# Patient Record
Sex: MALE | Race: WHITE | NOT HISPANIC OR LATINO | Employment: FULL TIME | URBAN - METROPOLITAN AREA
[De-identification: names, ages, dates, MRNs, and addresses within clinical notes are randomized per-mention and may not be internally consistent; named-entity substitution may affect disease eponyms.]

---

## 2017-05-04 ENCOUNTER — OFFICE VISIT (OUTPATIENT)
Dept: FAMILY MEDICINE | Facility: CLINIC | Age: 32
End: 2017-05-04

## 2017-05-04 VITALS
HEART RATE: 109 BPM | WEIGHT: 218.2 LBS | HEIGHT: 68 IN | OXYGEN SATURATION: 95 % | TEMPERATURE: 98.1 F | BODY MASS INDEX: 33.07 KG/M2 | DIASTOLIC BLOOD PRESSURE: 73 MMHG | SYSTOLIC BLOOD PRESSURE: 114 MMHG

## 2017-05-04 DIAGNOSIS — R55 SYNCOPE, UNSPECIFIED SYNCOPE TYPE: ICD-10-CM

## 2017-05-04 DIAGNOSIS — R11.0 NAUSEA: ICD-10-CM

## 2017-05-04 DIAGNOSIS — S09.90XA HEAD INJURY, INITIAL ENCOUNTER: Primary | ICD-10-CM

## 2017-05-04 DIAGNOSIS — R51.9 ACUTE NONINTRACTABLE HEADACHE, UNSPECIFIED HEADACHE TYPE: ICD-10-CM

## 2017-05-04 LAB
BUN SERPL-MCNC: 12.2 MG/DL (ref 7–21)
CALCIUM SERPL-MCNC: 9.7 MG/DL (ref 8.5–10.1)
CHLORIDE SERPLBLD-SCNC: 99.3 MMOL/L (ref 98–110)
CO2 SERPL-SCNC: 27.3 MMOL/L (ref 20–32)
CREAT SERPL-MCNC: 1.1 MG/DL (ref 0.7–1.3)
GFR SERPL CREATININE-BSD FRML MDRD: 83.5 ML/MIN/1.7 M2
GLUCOSE SERPL-MCNC: 105 MG'DL (ref 70–99)
HCT VFR BLD AUTO: 49.5 % (ref 40–53)
HEMOGLOBIN: 16.6 G/DL (ref 13.3–17.7)
MCH RBC QN AUTO: 32.4 PG (ref 26.5–35)
MCHC RBC AUTO-ENTMCNC: 33.5 G/DL (ref 32–36)
MCV RBC AUTO: 96.6 FL (ref 78–100)
PLATELET # BLD AUTO: 383 K/UL (ref 150–450)
POTASSIUM SERPL-SCNC: 4.2 MMOL/DL (ref 3.2–4.6)
RBC # BLD AUTO: 5.1 M/UL (ref 4.4–5.9)
SODIUM SERPL-SCNC: 139.2 MMOL/L (ref 132–142)
WBC # BLD AUTO: 8.4 K/UL (ref 4–11)

## 2017-05-04 RX ORDER — ACETAMINOPHEN 325 MG/1
650 TABLET ORAL EVERY 4 HOURS PRN
Qty: 100 TABLET | Refills: 0 | Status: SHIPPED | OUTPATIENT
Start: 2017-05-04 | End: 2021-03-24

## 2017-05-04 RX ORDER — ONDANSETRON 4 MG/1
4 TABLET, FILM COATED ORAL EVERY 8 HOURS PRN
Qty: 18 TABLET | Refills: 0 | Status: SHIPPED | OUTPATIENT
Start: 2017-05-04 | End: 2021-03-24

## 2017-05-04 NOTE — LETTER
Phu Loja  [address] [place] [address]        May 4, 2017           To Whom It May Concern:    Phu Loja was seen in our clinic. Please excuse the patient from work for the next week from 5/4/17 to 5/12/17 for medical reasons.        Sincerely,                Stephanie Chavez, DO

## 2017-05-04 NOTE — LETTER
May 15, 2017      Phu Loja  [address] [place] [address]        Dear Phu,    Please see below for your test results.  Mr. Loja, your lab results are back and are normal. Your blood cells, kidney function, electrolytes and blood sugar were all normal. Please call with any questions.         Resulted Orders   Basic Metabolic Panel ([place])   Result Value Ref Range    Urea Nitrogen 12.2 7.0 - 21.0 mg/dL    Calcium 9.7 8.5 - 10.1 mg/dL    Chloride 99.3 98.0 - 110.0 mmol/L    Carbon Dioxide 27.3 20.0 - 32.0 mmol/L    Creatinine 1.1 0.7 - 1.3 mg/dL    Glucose 105.0 (H) 70.0 - 99.0 mg'dL    Potassium 4.2 3.2 - 4.6 mmol/dL    Sodium 139.2 132.0 - 142.0 mmol/L    GFR Estimate 83.5 >60.0 mL/min/1.7 m2    GFR Estimate If Black >90 >60.0 mL/min/1.7 m2   CBC with Plt ([facility])   Result Value Ref Range    WBC 8.4 4.0 - 11.0 K/uL    RBC 5.1 4.4 - 5.9 M/uL    Hemoglobin 16.6 13.3 - 17.7 g/dL    Hematocrit 49.5 40.0 - 53.0 %    MCV 96.6 78.0 - 100.0 fL    MCH 32.4 26.5 - 35.0    MCHC 33.5 32.0 - 36.0 g/dL    Platelets 383.0 150.0 - 450.0 K/uL       If you have any questions, please call the clinic to make an appointment.    Sincerely,    Stephanie Chavez MD

## 2017-05-04 NOTE — PROGRESS NOTES
"Subjective  Phu Loja is a 31 year old male with a history of concussion in 2008 but otherwise healthy who presents with complaints of 2 syncopal episodes and subsequent head injury. The patient reports 3 weeks ago he woke in the night and got up to use the bathroom and fell, hitting his head on a countertop. He states his uncle was awake and witnessed his fall and reports loss of consciousness for several minutes hitting his head. After that, the patient has had a persistent headache that is located all over his head with associated nausea but no vomiting. He also reports feeling dizzy and light headed which is worse with rapid movements of his head. He also endorses tinnitus and scotomas. He states he has been working hard and caring for his children since the fall and has been trying to \"work through the pain.\"    Then, yesterday when the patient was at work he was standing at a machine when he had another syncopal episode and fell backwards. He reports LOC for ~10-15 min per coworkers and awoke to EMS. Witnesses report no jerky or seizure like movements. He refused to go to the hospital despite being advised by EMS. Since then he has had a more severe headache and nausea but no vomiting. He reports not being able to recall the minutes prior to the syncopal episode. Endorses mild confusion but no changes in his memory.     ROS: 10 point ROS neg other than the symptoms noted above in the HPI.    Social: , 3 children (1,5, 10 yo)  History   Smoking Status     Former Smoker   Smokeless Tobacco     Not on file       Objective  Vitals: /73 (BP Location: Left arm, Patient Position: Chair, Cuff Size: Adult Large)  Pulse 109  Temp 98.1  F (36.7  C) (Oral)  Ht 5' 8.25\" (173.4 cm)  Wt 218 lb 3.2 oz (99 kg)  SpO2 95%  BMI 32.93 kg/m2  General: Pleasant. Young man. No distress.  HEENT: Normocephalic and non-traumatic head. Moist mucous membranes. Extraocular movements intact. Sclera non-injected. " Pupils equal, round, and reactive to light. Normal fundoscopic exam. Tympanic membranes clear bilaterally. Hearing grossly intact. Oropharynx without swelling, erythema, or exudate. No cervical lymphadenopathy. Neck supple with full range of motion. No tenderness to palpation over head or neck.   Heart: Regular rate and rhythm. No murmurs, rubs, or gallops.  Extremities: Extremities warm and well-perfused. No peripheral edema.  Lungs: Clear to auscultation bilaterally. No wheezes or crackles. Good air movement.  Neck: Normal to inspection.  Full ROM in flexion, extension, lateral flexion, and rotation.  CN XII intact.  No cervical spinous processes tenderness.  Biceps, triceps, and brachioradialis reflexes symmetric and intact.  Neuro: Alert and oriented x3. CN II-XII intact. Deep tendon reflexes of upper and lower extremities symmetric and intact. Strength and sensation grossly symmetric and normal.    Results for orders placed or performed in visit on 05/04/17   Basic Metabolic Panel ([place])   Result Value Ref Range    Urea Nitrogen 12.2 7.0 - 21.0 mg/dL    Calcium 9.7 8.5 - 10.1 mg/dL    Chloride 99.3 98.0 - 110.0 mmol/L    Carbon Dioxide 27.3 20.0 - 32.0 mmol/L    Creatinine 1.1 0.7 - 1.3 mg/dL    Glucose 105.0 (H) 70.0 - 99.0 mg'dL    Potassium 4.2 3.2 - 4.6 mmol/dL    Sodium 139.2 132.0 - 142.0 mmol/L    GFR Estimate 83.5 >60.0 mL/min/1.7 m2    GFR Estimate If Black >90 >60.0 mL/min/1.7 m2   CBC with Plt ([facility])   Result Value Ref Range    WBC 8.4 4.0 - 11.0 K/uL    RBC 5.1 4.4 - 5.9 M/uL    Hemoglobin 16.6 13.3 - 17.7 g/dL    Hematocrit 49.5 40.0 - 53.0 %    MCV 96.6 78.0 - 100.0 fL    MCH 32.4 26.5 - 35.0    MCHC 33.5 32.0 - 36.0 g/dL    Platelets 383.0 150.0 - 450.0 K/uL       Assessment & Plan    Phu was seen today for 2 syncopal episodes followed by head injury.    Diagnoses and all orders for this visit:    Head injury, initial encounter: No acute neurological deficits on exam. Will get imaging  and send to Neuro. Discussed importance of mental/physical rest for next.   -     CT HEAD W/O CONTRAST; Future  -     NEUROLOGY ADULT REFERRAL; Future  -     Basic Metabolic Panel ([place])  -     CBC with Plt ([facility])    Acute nonintractable headache, unspecified headache type  -     Basic Metabolic Panel ([place])  -     CBC with Plt (P )  -     acetaminophen (TYLENOL) 325 MG tablet; Take 2 tablets (650 mg) by mouth every 4 hours as needed for mild pain     Syncope, unspecified syncope type: EKG normal. First syncopal episode may have been secondary to orthostasis, dehydration. Patient may have had concussion. No rest after initial concussion may have resulted in second syncopal episode.   -     EKG 12-lead complete w/read - Clinics    Nausea  -     ondansetron (ZOFRAN) 4 MG tablet; Take 1 tablet (4 mg) by mouth every 8 hours as needed for nausea      Orders Placed This Encounter   Procedures     CT HEAD W/O CONTRAST     Basic Metabolic Panel ([place])     CBC with Plt ([facility])     NEUROLOGY ADULT REFERRAL     EKG 12-lead complete w/read - Clinics       Patient Instructions   CT REFERRAL:  [facility]  [address] [place] [address]  [phone]  Date:  Friday May 5, 2017  Time:  1:15 PM    NEUROLOGY REFERRAL:  [facility]  [address]  [place] [address]  [phone]  Date:  Thursday Vero 15, 2017  Time:  11:15 PM  Please bring insurance card and photo ID to appointment.  Given to patient.  Norma Ahn  5/4/17            Return to clinic in 5-7 days.    Patient precepted with Dr. Hartley.    Stephanie Chavez DO   PGY-1 [facility]  [facility]  Pager: [phone]

## 2017-05-04 NOTE — MR AVS SNAPSHOT
After Visit Summary   5/4/2017    Phu Loja    MRN: [id number]           Patient Information     Date Of Birth          1985        Visit Information        Provider Department      5/4/2017 2:30 PM Stephanie Chavez MD [facility]        Today's Diagnoses     Head injury, initial encounter    -  1    Acute nonintractable headache, unspecified headache type        Syncope, unspecified syncope type        Nausea          Care Instructions    CT REFERRAL:  [facility]  [address] [place] [address]  [phone]  Date:  Friday May 5, 2017  Time:  1:15 PM    NEUROLOGY REFERRAL:  [facility]  [address]  [place] [address]  [phone]  Date:  Thursday Vero 15, 2017  Time:  11:15 PM  Please bring insurance card and photo ID to appointment.  Given to patient.  Norma DARLEEN Ahn  5/4/17              Follow-ups after your visit        Additional Services     NEUROLOGY ADULT REFERRAL       Patient to stop at the [employer] Desk    Reason for Referral: Head Injury x2 with sequelae (headache, nausea)  Referral Location: [facility] [facility] [place]): [phone]     needed: No  Language: English    May leave message on voicemail: Yes    ([place] Only) Referral should be tracked (Yes/No)?                  Who to contact     Please call your clinic at [phone] to:    Ask questions about your health    Make or cancel appointments    Discuss your medicines    Learn about your test results    Speak to your doctor   If you have compliments or concerns about an experience at your clinic, or if you wish to file a complaint, please contact [facility] Patient Relations at [phone] or email us at [email or web address]         Additional Information About Your Visit        MyChart Information     [facility] is an electronic gateway that provides easy, online access to your medical records. With  "MyChart, you can request a clinic appointment, read your test results, renew a prescription or communicate with your care team.     To sign up for [email or web address] visit the website at [email or web address]   You will be asked to enter the access code listed below, as well as some personal information. Please follow the directions to create your username and password.     Your access code is: [email or web address]  Expires: 2017  8:19 AM     Your access code will  in 90 days. If you need help or a new code, please contact your [facility] or call [phone] for assistance.        Care EveryWhere ID     This is your Care EveryWhere ID. This could be used by other organizations to access your [place] medical records  [id number]        Your Vitals Were     Pulse Temperature Height Pulse Oximetry BMI (Body Mass Index)       109 98.1  F (36.7  C) (Oral) 5' 8.25\" (173.4 cm) 95% 32.93 kg/m2        Blood Pressure from Last 3 Encounters:   17 114/73    Weight from Last 3 Encounters:   17 218 lb 3.2 oz (99 kg)              We Performed the Following     Basic Metabolic Panel ([place])     CBC with Plt ([facility])     EKG 12-lead complete w/read - Clinics          Today's Medication Changes          These changes are accurate as of: 17 11:59 PM.  If you have any questions, ask your nurse or doctor.               Start taking these medicines.        Dose/Directions    acetaminophen 325 MG tablet   Commonly known as:  TYLENOL   Used for:  Acute nonintractable headache, unspecified headache type   Started by:  Stephanie Chavez MD        Dose:  650 mg   Take 2 tablets (650 mg) by mouth every 4 hours as needed for mild pain   Quantity:  100 tablet   Refills:  0       ondansetron 4 MG tablet   Commonly known as:  ZOFRAN   Used for:  Nausea   Started by:  Stephanie Chavez MD        Dose:  4 mg   Take 1 tablet (4 mg) by mouth every 8 hours as needed for nausea   Quantity:  " 18 tablet   Refills:  0            Where to get your medicines      These medications were sent to [id number] - [place], [place] - [address]  [address], [place] [address]     Phone:  [phone]     acetaminophen 325 MG tablet    ondansetron 4 MG tablet                Primary Care Provider Office Phone # Fax #    Stephanie Gilbert Chavez [phone]       [facility] [address] [place] [address]        Thank you!     Thank you for choosing [facility]  for your care. Our goal is always to provide you with excellent care. Hearing back from our patients is one way we can continue to improve our services. Please take a few minutes to complete the written survey that you may receive in the mail after your visit with us. Thank you!             Your Updated Medication List - Protect others around you: Learn how to safely use, store and throw away your medicines at www.disposemymeds.org.          This list is accurate as of: 5/4/17 11:59 PM.  Always use your most recent med list.                   Brand Name Dispense Instructions for use    acetaminophen 325 MG tablet    TYLENOL    100 tablet    Take 2 tablets (650 mg) by mouth every 4 hours as needed for mild pain       ondansetron 4 MG tablet    ZOFRAN    18 tablet    Take 1 tablet (4 mg) by mouth every 8 hours as needed for nausea

## 2017-05-04 NOTE — PATIENT INSTRUCTIONS
CT REFERRAL:  [facility]  [address] [place] [address]  [phone]  Date:  Friday May 5, 2017  Time:  1:15 PM    NEUROLOGY REFERRAL:  [facility]  [address]  [place] [address]  [phone]  Date:  Thursday Vero 15, 2017  Time:  11:15 PM  Please bring insurance card and photo ID to appointment.  Given to patient.  Norma Ahn  5/4/17

## 2017-05-09 DIAGNOSIS — S09.90XA HEAD INJURY, INITIAL ENCOUNTER: ICD-10-CM

## 2017-05-11 ENCOUNTER — TELEPHONE (OUTPATIENT)
Dept: FAMILY MEDICINE | Facility: CLINIC | Age: 32
End: 2017-05-11

## 2017-05-11 NOTE — TELEPHONE ENCOUNTER
[facility] phone call message- patient requesting results:    Test: Lab    Date of test: [id number]    Additional Comments: Calling for lab results and patient needs a doctors statement to return to work.    OK to leave a message on voice mail? Yes    Primary language: English      needed? No    Call taken on May 11, 2017 at 9:20 AM by Isai Millard

## 2017-05-11 NOTE — PROGRESS NOTES
Preceptor attestation:  Patient seen and discussed with the resident. Assessment and plan reviewed with resident and agreed upon.  EKG reviewed. No acute findings.  Agree with computer interpretation.  See scanned sheet for details.      FamHx:  Paternal Uncle alive.    Supervising physician: Easton Hartley  [facility]

## 2017-05-11 NOTE — TELEPHONE ENCOUNTER
Pt would like letter/note from MD to return to work (not sure if he should be on restrictions). Pt would like note today if possible. Pt works night shift.     Gave results of CT for pt, normal.     Pt would like results of x-ray taken near [facility]-advised pt it's not scanned in his chart yet.     Pt states the med is helping him w/ his HAs.     Routed to Dr. Chavez. /NGA Nieves

## 2017-05-11 NOTE — TELEPHONE ENCOUNTER
He needs letter (and say no restrictions)-could you put it in and I'll print it out. Thanks!  /NGA Nieves

## 2017-05-11 NOTE — TELEPHONE ENCOUNTER
Please call patient back to let him know it is okay for him to return to work. No bleed seen on head CT. If his headaches are resolved, he is okay to work.    Thank you!    Stephanie Chavez

## 2017-05-12 ENCOUNTER — TELEPHONE (OUTPATIENT)
Dept: FAMILY MEDICINE | Facility: CLINIC | Age: 32
End: 2017-05-12

## 2017-05-12 NOTE — TELEPHONE ENCOUNTER
[facility] phone call message- general phone call:    Reason for call: PT stopped by to  a note to return to work. He will come back this afternoon would like Dr Chavez write him a note for him to go back to work please call pt if any questions   Return call needed: Yes    OK to leave a message on voice mail? Yes    Primary language: English      needed? No    Call taken on May 12, 2017 at 10:32 AM by Mica Lorenzo

## 2017-08-14 ENCOUNTER — OFFICE VISIT (OUTPATIENT)
Dept: FAMILY MEDICINE | Facility: CLINIC | Age: 32
End: 2017-08-14

## 2017-08-14 VITALS
HEIGHT: 69 IN | OXYGEN SATURATION: 95 % | SYSTOLIC BLOOD PRESSURE: 128 MMHG | TEMPERATURE: 99.1 F | HEART RATE: 94 BPM | WEIGHT: 220 LBS | BODY MASS INDEX: 32.58 KG/M2 | DIASTOLIC BLOOD PRESSURE: 82 MMHG

## 2017-08-14 DIAGNOSIS — H10.021 PINK EYE DISEASE OF RIGHT EYE: Primary | ICD-10-CM

## 2017-08-14 RX ORDER — POLYMYXIN B SULFATE AND TRIMETHOPRIM 1; 10000 MG/ML; [USP'U]/ML
1 SOLUTION OPHTHALMIC
Qty: 1 BOTTLE | Refills: 0 | Status: SHIPPED | OUTPATIENT
Start: 2017-08-14 | End: 2017-08-21

## 2017-08-14 NOTE — MR AVS SNAPSHOT
"              After Visit Summary   2017    Phu Loja    MRN: [id number]           Patient Information     Date Of Birth          1985        Visit Information        Provider Department      2017 4:10 PM Jeremiah Jacobsen MD [facility]        Today's Diagnoses     Pink eye disease of right eye    -  1       Follow-ups after your visit        Who to contact     Please call your clinic at [phone] to:    Ask questions about your health    Make or cancel appointments    Discuss your medicines    Learn about your test results    Speak to your doctor   If you have compliments or concerns about an experience at your clinic, or if you wish to file a complaint, please contact [facility] Patient Relations at [phone] or email us at [email or web address]         Additional Information About Your Visit        MyChart Information     [employer] is an electronic gateway that provides easy, online access to your medical records. With [employer], you can request a clinic appointment, read your test results, renew a prescription or communicate with your care team.     To sign up for [employer] visit the website at [email or web address]   You will be asked to enter the access code listed below, as well as some personal information. Please follow the directions to create your username and password.     Your access code is: [email or web address]  Expires: 2017  5:01 PM     Your access code will  in 90 days. If you need help or a new code, please contact your [facility] or call [phone] for assistance.        Care EveryWhere ID     This is your Care EveryWhere ID. This could be used by other organizations to access your [place] medical records  [id number]        Your Vitals Were     Pulse Temperature Height Pulse Oximetry BMI (Body Mass Index)       94 99.1  F (37.3  C) (Oral) 5' 9\" (175.3 cm) 95% 32.49 kg/m2        Blood Pressure " from Last 3 Encounters:   08/14/17 128/82   05/04/17 114/73    Weight from Last 3 Encounters:   08/14/17 220 lb (99.8 kg)   05/04/17 218 lb 3.2 oz (99 kg)              Today, you had the following     No orders found for display         Today's Medication Changes          These changes are accurate as of: 8/14/17  5:01 PM.  If you have any questions, ask your nurse or doctor.               Start taking these medicines.        Dose/Directions    trimethoprim-polymyxin b ophthalmic solution   Commonly known as:  POLYTRIM   Used for:  Pink eye disease of right eye   Started by:  Jeremiah Jacobsen MD        Dose:  1 drop   Apply 1 drop to eye every 3 hours for 7 days   Quantity:  1 Bottle   Refills:  0            Where to get your medicines      These medications were sent to [id number] - [place], [place] - [address] [place] [address] AT [facility] [address]  [address], [place] [address]     Phone:  [phone]     trimethoprim-polymyxin b ophthalmic solution                Primary Care Provider Office Phone # Fax #    Stephanie Gilbert Chavez [phone]       [facility] [address] [place] [address]        Equal Access to Services     NIMA MATTHEWS AH: Hadii chika borregoo Soelliott, waaxda luqadaha, qaybta kaalmada adeegyada, laila chris. So [facility] [phone].    ATENCIÓN: Si habla español, tiene a cortez disposición servicios gratuitos de asistencia lingüística. Tremaine al [phone].    We comply with applicable federal civil rights laws and [place] laws. We do not discriminate on the basis of race, color, national origin, age, disability sex, sexual orientation or gender identity.            Thank you!     Thank you for choosing [facility]  for your care. Our goal is always to provide you with excellent care. Hearing back from our patients is one way we can continue to improve our services. Please take a few minutes to complete the written  survey that you may receive in the mail after your visit with us. Thank you!             Your Updated Medication List - Protect others around you: Learn how to safely use, store and throw away your medicines at www.disposemymeds.org.          This list is accurate as of: 8/14/17  5:01 PM.  Always use your most recent med list.                   Brand Name Dispense Instructions for use Diagnosis    acetaminophen 325 MG tablet    TYLENOL    100 tablet    Take 2 tablets (650 mg) by mouth every 4 hours as needed for mild pain    Acute nonintractable headache, unspecified headache type       ondansetron 4 MG tablet    ZOFRAN    18 tablet    Take 1 tablet (4 mg) by mouth every 8 hours as needed for nausea    Nausea       trimethoprim-polymyxin b ophthalmic solution    POLYTRIM    1 Bottle    Apply 1 drop to eye every 3 hours for 7 days    Pink eye disease of right eye

## 2017-08-14 NOTE — LETTER
RETURN TO WORK/SCHOOL FORM    8/14/2017    Re: Phu Loja  1985      To Whom It May Concern:     Phu Loja was seen in clinic today.  He may return to work without restrictions on 8/15/17.             Jeremiah Jacobsen MD  8/14/2017 4:59 PM

## 2017-08-15 NOTE — PROGRESS NOTES
"Subjective: Phu Loja is a 32 year old who presents today with a concern for pink eye. He states his daughter was diagnosed with pinkeye by a physician and started on antibiotic drops. He developed redness and irritation in his right eye a few days later and started taking his daughter's drops he's been getting steadily better. Today though when he went to work he was sent home from work because of his stool red eye. Patient states it is about \"75% better\". He needs to be evaluated by a physician before he can return to work. He is not having eye pain per se and has not had any change in his vision. He is not having any headache; he's not having any neurologic symptoms.    PMHX/PSHX/MEDS/ALLERGIES/SHX/FHX reviewed and updated in [facility].   REVIEW OF SYSTEMS    General: No fevers  Head: No headache   CV: No chest pain or palpitations  Resp: No shortness of breath.  No cough.  GI: No constipation, or diarrhea.    : No urinary c/o    Objective: /82  Pulse 94  Temp 99.1  F (37.3  C) (Oral)  Ht 5' 9\" (175.3 cm)  Wt 220 lb (99.8 kg)  SpO2 95%  BMI 32.49 kg/m2   Gen: Well nourished and in NAD   HEENT: TMs normal color and landmarks, nasopharynx pink and moist, oropharynx pink and moist  Eyes: some tearing.  Mild palpebral and peripheral bulbar conjunctival injection of the right eye.  No abnormal fluorescein uptake bilaterally.  Everted lid exam is normal bilaterally.  EOMI. PERRLA.    CV: RRR - no murmurs, rubs, or gallups,   Pulm: CTAB, no wheezes/rales/rhonchi, good air entry     Assessment/ Plan:  1. Pink eye- right eye  It's hard to know if this is viral or bacterial. The patient is been treating with antibiotic drops for the last 4 days and he states it's improved. We'll have him continue with antibiotic drops next 5 days and he'll follow up with not improving. I gave him a letter for work.  - trimethoprim-polymyxin b (POLYTRIM) ophthalmic solution; Apply 1 drop to eye every 3 hours for 5 days  " Dispense: 1 Bottle; Refill:     Total of 25 minutes was spent in face to face contact with patient with > 50% in counseling about infective nature of pink eye and writing a letter for work.  Options for treatment and/or follow-up care were reviewed with the patient. Phu Loja was engaged and actively involved in the decision making process. He verbalized understanding of the options discussed and was satisfied with the final plan.    Jeremiah Jacobsen

## 2020-10-21 ENCOUNTER — TRANSFERRED RECORDS (OUTPATIENT)
Dept: HEALTH INFORMATION MANAGEMENT | Facility: CLINIC | Age: 35
End: 2020-10-21

## 2020-11-20 ENCOUNTER — OFFICE VISIT (OUTPATIENT)
Dept: FAMILY MEDICINE | Facility: CLINIC | Age: 35
End: 2020-11-20
Payer: COMMERCIAL

## 2020-11-20 VITALS
WEIGHT: 224.8 LBS | HEART RATE: 92 BPM | BODY MASS INDEX: 33.3 KG/M2 | HEIGHT: 69 IN | DIASTOLIC BLOOD PRESSURE: 80 MMHG | TEMPERATURE: 98.7 F | RESPIRATION RATE: 18 BRPM | SYSTOLIC BLOOD PRESSURE: 135 MMHG

## 2020-11-20 DIAGNOSIS — F43.23 ADJUSTMENT DISORDER WITH MIXED ANXIETY AND DEPRESSED MOOD: Primary | ICD-10-CM

## 2020-11-20 PROCEDURE — 99203 OFFICE O/P NEW LOW 30 MIN: CPT | Performed by: STUDENT IN AN ORGANIZED HEALTH CARE EDUCATION/TRAINING PROGRAM

## 2020-11-20 ASSESSMENT — PATIENT HEALTH QUESTIONNAIRE - PHQ9: SUM OF ALL RESPONSES TO PHQ QUESTIONS 1-9: 2

## 2020-11-20 ASSESSMENT — MIFFLIN-ST. JEOR: SCORE: 1942.13

## 2020-11-20 NOTE — PROGRESS NOTES
Preceptor Attestation:  Patient seen and evaluated in person. I discussed the patient with the resident. I have verified the content of the note, which accurately reflects my assessment of the patient and the plan of care.  Supervising Physician:  Cassie Gomez MD.

## 2020-11-20 NOTE — PROGRESS NOTES
"[facility] Note    Patient: Phu Loja  : 1985  MRN: [id number]         SUBJECTIVE       Phu Loja is a 35 year old male with a PMH significant for:  There is no problem list on file for this patient.    He presents to clinic today with chief complaint of needing disability/leave paperwork.    His sister  on 2020 unexpectedly. He took leave from work starting  to 2020, for about 3 weeks due to depression. He was not able to focus on work and was having trouble sleeping. He was seeing a counselor through his work who helped him a lot. He is provided several free counseling services through work and has seen the counselor twice. The counselor told him to see a doctor. His counselor is not able to fill out the paperwork for him. 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     Past Medical History, Past Surgical History, Medications, Allergies, and Family History were reviewed and updated as needed.        REVIEW OF SYSTEMS      ROS: 10 point ROS neg other than the symptoms noted above in the HPI.        OBJECTIVE     Vitals:    20 1631   BP: 135/80   Pulse: 92   Resp: 18   Temp: 98.7  F (37.1  C)   Weight: 102 kg (224 lb 12.8 oz)   Height: 1.74 m (5' 8.5\")     Body mass index is 33.68 kg/m .    Physical Exam:  General: alert, appears comfortable, no acute distress  HEENT: atraumatic, conjunctiva clear without erythema, EOM's intact  Neck: supple  Cardiac: normal rate and rhythm with no murmurs or extra sounds  Resp: lungs clear to auscultation bilaterally with no crackles or wheezes, no increased work of breathing  Psych: patient tearful at times, appears anxious and restless, affect congruent with mood    LABORATORY:  No results found for this or any previous visit (from the past 24 hour(s)).      ASSESSMENT AND PLAN     1. Adjustment disorder with mixed anxiety " and depressed mood  Patient presents with 2-month history of depression with trouble focusing and trouble sleeping after the unexpected death of his sister on September 12, 2020. Patient took a leave from work from 10/12/20-11/2/20 due to trouble focusing on work and difficulty sleeping as he processed his grief. Patient has seen a counselor twice during this period which he found helpful. PHQ-9 with score of 2 today. No suicidal ideation. He requests leave of absence paperwork to be filled out due to his leave. He will call the clinic with his counselor's name, agency, and contact information and we will request his records. I will fill out his paperwork after reviewing his records. He also would like a mental health referral for additional counseling.    - MENTAL HEALTH REFERRAL  -        Follow up: No follow-ups on file.     Patient was discussed with attending physician, Dr. Luz Marina Gomez MD, who agrees with the assessment and plan.    Stephanie Benito MD, PGY-1  [facility]  11/20/2020

## 2020-11-20 NOTE — PATIENT INSTRUCTIONS
Plan:  1. It was a pleasure seeing you in clinic today.  2. Next steps: 1) You will call the clinic with your counselor's name, agency, and contact information (Phone and fax numbers) 2) We will finish filling out the Release of Information form and send it to the agency. They will then send us the records. 3) I will need a few days to look at the records, fill out the paperwork, and fax it back.  3) You will get a call to schedule an appointment for therapy through our clinic.     Please schedule a clinic appointment as needed for any future concerns.     [facility]  Phone: [phone]  Address: [address] [place] [address]    11/23/20   MENTAL HEALTH REFERRAL    Mental Health referral routed to behavioral health team for recommendations. See Documentation Only encounter for more information.    Evangelina Potter

## 2020-11-23 ENCOUNTER — DOCUMENTATION ONLY (OUTPATIENT)
Dept: PSYCHOLOGY | Facility: CLINIC | Age: 35
End: 2020-11-23

## 2020-11-23 NOTE — LETTER
December 22, 2020      Phu Loja  [address] [place] [address]        Dear Phu,    I have been unsuccessful reaching you by phone in regards to your referral for therapy. We would life to offer you an appointment with one of our providers here at the [facility] (pending there is still access available). If this is something you are still interested in please call me at my direct line [phone] to get scheduled. Also, below are community resources our Behavior Health team has referred for you. Please review them and contact the one of your choice to schedule if you would prefer that.     [facility]  [address]  [place] [address]  [phone]   [facility]  [address]  [address]  [place] [address]  Phone:  [phone]     [facility]  [address]  [place] [address]  [phone]     [facility]  [address]  [address]  [place], [place] [address]  [phone]     [facility]  [address]  [place], [place] [id number] [phone]    Sincerely,    Evangelina RILEY, Referral Coordinator  
Patient

## 2020-11-23 NOTE — PROGRESS NOTES
Behavioral Health Team,    Patient is being referred for mental health services by their provider, Dr. Benito.  Please advise if we are able to see patient for in house treatment or if a community option would be best.    Thank you,    Evangelina Potter  11/23/2020

## 2020-11-25 ENCOUNTER — TELEPHONE (OUTPATIENT)
Dept: FAMILY MEDICINE | Facility: CLINIC | Age: 35
End: 2020-11-25

## 2020-11-25 NOTE — TELEPHONE ENCOUNTER
[facility] phone call message- general phone call:    Reason for call: He is calling back to  with his therapist information Dr.james evelio Marquis at [facility] address [address] [place] [address] [phone]    Action desired: call back.    Return call needed: Yes    OK to leave a message on voice mail? Yes    Advised patient to response may take up to 2 business days: Yes    Primary language: English      needed? No    Call taken on November 25, 2020 at 12:08 PM by David Tran

## 2020-11-27 NOTE — TELEPHONE ENCOUNTER
SYLWIA was faxed to Dr. Lopez office at [facility] at [phone].  Thank you.  ADEN Valencia

## 2020-12-01 NOTE — PROGRESS NOTES
Review of Dr. Benito's order and note indicates that this is a referral for therapy to address depressed mood.  Requesting leave from work secondary to depressed mood after sudden death of sister on September 12, 2020.  Did receive counseling through [employer] at work, but counselor cannot complete paperwork for him and is asking Dr. Benito to complete this for him.  Dr. Benito did get SYLWIA for counselor which will be helpful.  Patient would like additional counseling beyond what is available through work.     Upon review of EPIC today, both Dr. Momin and Dr. Mcelroy appear to have openings to  this patient.  Will ask our referral team to reach out to patient to offer visit with one of these providers.  If neither of their schedules will work for patient for whatever reason, will ask our referral team to share the following community based resources for mental health.    [facility] [address] [address]  [phone] (for appointments)  Fax: [phone]  COVID-19 Update 3-: ACP at [facility] are taking new patients but doing all visits by telephone or video. See this website for up to date changes: [email or web address]  [facility] [address]  [place] [address]  Phone:  [phone]  Fax: [phone]  Hours:  Monday - Friday 8:30 - 5:00pm    COVID-19 Update 3-: ACP at [facility] are taking new patients but doing all visits by telephone or video. See this website for up to date changes: [email or web address]    [facility]  [address]  [place] [address]  [phone]  COVID-19 Update 3- : Unable to reach by phone but website has the following update: In response to the COVID-19 going around we are utilizing [facility] as a Telehealth  "alternative to in clinic visits. This does not mean our clinic is closed but that individuals may opt to utilize this service to lower risk factors of niles COVID-19. A separate consent form needs to be completed to use telehealth. See [email or web address] for details.    [facility]  [address]  [address]  [place], [place] [address]  [phone]  COVID-19 Update 03-: Due to the COVID-19 Virus, [facility] is offering only HIPPA secure Telehealth care at this time. In order to revive care, you ll need to call the clinic at [phone] and provide your email to clinic support staff.    [facility] [address]  [place] [address]  [phone]  COVID-19 Update 3-:  [facility] and Urgent care are both operating but screening for symptoms prior to seeing anyone in person.   Only providing psychiatry services to uninsured patients at this time.    If patient will be seen by in-house  provider, will ask our referral team to screen for telehealth barriers at time of follow up so that we can identify who may benefit from use of telehealth hub at [facility].  Help for patients who will be scheduled in the community, but need telehealth support will be managed on a case by case basis.    Do you have access to a computer with a webcam or smartphone?   Do you have access to the internet?   Do you have a safe and private space for this conversation?     If the patient answers \"no\" to any of these questions, referral team will engage patient in conversation about whether patient would like to access services via [facility]'s telehealth hub.  This would ensure access while mitigating risk by limiting time in face to face contact with provider (safter if under 15 minutes of exposure in close space, etc.).  If so, this will need to be coordinated and documented on [facility] calendar.    Let me know if you have questions " or would like additional follow up from me.  Thanks!      Xin Simeon, Ph.D.,LP     Disclaimer  The above treatment recommendations are based on consultation with the patient's primary care provider and a review of relevant information in EPIC.? I have not personally examined the patient.? All recommendations should be implemented with considerations of the patient's relevant prior history and current clinical status.  Please contact me with any questions about the care of this patient.

## 2020-12-08 NOTE — PROGRESS NOTES
12/08/20 3:30 PM- first attempt to contact this patient. No answer. VM box was full. Will try again within 1 week.     12/22/20 2:42 PM- second attempt to contact pt. I was able to leave a brief VM this time for a return call. Letter will be sent to. No more outreach at this time.     Evangelina Potter

## 2020-12-11 NOTE — TELEPHONE ENCOUNTER
Called today and the  and she stated she was following up with the person who received the SYLWIA.  She will call me back later and update me.  Joanna Torres, [facility]

## 2021-01-14 ENCOUNTER — TELEPHONE (OUTPATIENT)
Dept: FAMILY MEDICINE | Facility: CLINIC | Age: 36
End: 2021-01-14

## 2021-01-14 NOTE — TELEPHONE ENCOUNTER
[facility] phone call message- patient requesting form status: Came in for an appointment 11/20/20 for disability forms and he says they have not been filled out and sent yet.     Type of Form: Disability/FMLA    Given to: Provider    Submitted: within 10 business days     Date Submitted: 11/20/20     Date Needed by: ASAP    Additional Comments: Please call PT regarding forms. I do not see anything scanned into media regarding this.    OK to leave a message on voice mail? Yes    Primary language: English      needed? No    Call taken on January 14, 2021 at 9:51 AM by Darcy Gutierrez

## 2021-01-15 NOTE — TELEPHONE ENCOUNTER
Patient was called and a message was left notifying him that paperwork was faxed.  Joanna Torres, CMA

## 2021-03-24 ENCOUNTER — OFFICE VISIT (OUTPATIENT)
Dept: FAMILY MEDICINE | Facility: CLINIC | Age: 36
End: 2021-03-24
Payer: COMMERCIAL

## 2021-03-24 VITALS
HEART RATE: 99 BPM | WEIGHT: 236 LBS | BODY MASS INDEX: 35.36 KG/M2 | OXYGEN SATURATION: 97 % | SYSTOLIC BLOOD PRESSURE: 117 MMHG | DIASTOLIC BLOOD PRESSURE: 79 MMHG | TEMPERATURE: 98.7 F | RESPIRATION RATE: 16 BRPM

## 2021-03-24 DIAGNOSIS — L50.9 URTICARIA: ICD-10-CM

## 2021-03-24 DIAGNOSIS — Z23 NEED FOR VACCINATION: Primary | ICD-10-CM

## 2021-03-24 PROCEDURE — 90715 TDAP VACCINE 7 YRS/> IM: CPT | Performed by: FAMILY MEDICINE

## 2021-03-24 PROCEDURE — 99213 OFFICE O/P EST LOW 20 MIN: CPT | Mod: 25 | Performed by: FAMILY MEDICINE

## 2021-03-24 PROCEDURE — 90471 IMMUNIZATION ADMIN: CPT | Performed by: FAMILY MEDICINE

## 2021-03-24 RX ORDER — LORATADINE 10 MG/1
10 TABLET ORAL EVERY MORNING
Qty: 15 TABLET | Refills: 1 | Status: SHIPPED | OUTPATIENT
Start: 2021-03-24 | End: 2021-04-16

## 2021-03-24 RX ORDER — CETIRIZINE HYDROCHLORIDE 10 MG/1
10 TABLET ORAL AT BEDTIME
Qty: 15 TABLET | Refills: 1 | Status: SHIPPED | OUTPATIENT
Start: 2021-03-24 | End: 2021-04-16

## 2021-03-24 NOTE — PROGRESS NOTES
Assessment & Plan   1. Urticaria, based on history, though no lesions on exam presently.  I showed him pictures, which he says is consistent with his skin findings.  Unclear trigger.  -- Cetirizine at bedtime, loratadine QAM until 3 days before follow-up.    2. Lump in right thigh, consistent with lipoma.  Interfering with exercise.  -- Schedule for lipoma excision.    Health maintenance: TDaP vaccine.    Follow-up in 2 weeks for lipoma excision.  Stop anti-histamines 3 days before, so we can see if urticaria returns with discontinuation.    Subjective   Phu Ljoa is a 35 year old male with no significant past medical history who presents for 2 issues: Hives and a spider bite.    Regarding the hives, this is the first time he has ever had it.  They first started 5 days ago with spots on his back, legs, sides of his abdomen, and forearms.  They are very itchy.  They go away with Benadryl but then returned.  He is unable to identify any particular trigger.  He denies insect bites, food changes, fevers, recent illness.  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    Regarding the spider bite, he says that he was bit by a spider in his right thigh some years ago, and since then it has been growing bigger and bigger.  It is not itchy, and there is no drainage.  It is exacerbated by exercise, and its size is starting to impede his ability to do some exercises.    Social: He reports that he has quit smoking. He has never used smokeless tobacco. He reports current alcohol use. He reports that he does not use drugs.    Objective   Vitals: /79 (BP Location: Right arm, Patient Position: Sitting, Cuff Size: Adult Regular)   Pulse 99   Temp 98.7  F (37.1  C) (Oral)   Resp 16   Wt 107 kg (236 lb)   SpO2 97%   BMI 35.36 kg/m    General: Pleasant. Young man. No distress.  Skin: Currently no skin lesions but some evidence of scratching.  Right leg with ~3 cm  diameter soft mobile non-tender mass, variably echogenic on US.  Psych: Appropriate grooming and hygiene. Speech normal rate. Appropriate mood and affect.

## 2021-03-24 NOTE — PATIENT INSTRUCTIONS
Take ceterizine at night and loratadine during the day.  Take those meds schedule until 3 days before your next appointment.  Then when you follow-up, we can make sure the hives are staying away.      Patient Education     Hives (Adult)  Hives are pink or red bumps on the skin. These bumps are also known as wheals. The bumps can itch, burn, or sting. Hives can occur anywhere on the body. They vary in size and shape and can form in clusters. Individual hives can appear and go away quickly. New hives may develop as old ones fade. Hives are common and usually harmless. They are not contagious. Occasionally, hives are a sign of a serious allergy.   Hives are often caused by an allergic reaction. They may occur from:     Certain foods, such as shellfish, nuts, tomatoes, or berries    Contact with something in the environment, such as pollens, animals, or mold    Certain medicines    Sun or cold air    Viral infections, such as a cold, the flu, or strep throat  If the hives continue to come and go over many weeks without any other symptoms (chronic hives), the cause may be very hard to figure out.   You may be prescribed medicines to ease swelling and itching. Follow all instructions when using these medicines. The hives will usually fade in a few days. But they can last for weeks or months.   Home care   Follow these tips:    Try to find the cause of the hives and eliminate it. Discuss possible causes with your healthcare provider. Your healthcare provider may ask you to keep track of the food you eat and your lifestyle to help find the cause of the hives.    Don t scratch the hives. Scratching will delay healing. To reduce itching, apply cool, wet compresses to the skin.    Dress in soft, loose cotton clothing.    Don t bathe in hot water. This can make the itching worse.    Apply an ice pack or cool pack wrapped in a thin towel to your skin. This will help reduce redness and itching. But if your hives were caused by  exposure to cold, then do not apply more cold to them.    You may use over-the counter antihistamines to reduce itching. Some older antihistamines, such as diphenhydramine and chlorpheniramine, are inexpensive. But they need to be taken often and may make you sleepy. They are best used at bedtime. Don t use diphenhydramine if you have glaucoma or have trouble urinating because of an enlarged prostate. Newer antihistamines, such as loratadine, cetirizine, levocetirizine, and fexofenadine, are generally more expensive. But they tend to have fewer side effects. They can be taken less often.    Another type of antihistamine is used to treat heartburn. This type includes nizatidine, famotidine, and cimetidine. These are sometimes used along with the above antihistamines if a single medicine is not working.    If the hives are severe and you do not respond well to other medicines, you may be given a steroid, such as prednisone, to take for a short time. Follow all instructions carefully when taking this medicine. Tell your healthcare provider about any side effects.  Follow-up care   Follow up with your healthcare provider if your symptoms don't get better in 2 days. Ask your provider about allergy testing if you have had a severe reaction or have had several episodes of hives. Allergy testing may help figure out what you are allergic to. You may need blood tests, a urine test, or skin tests.   When to seek medical advice   Call your healthcare provider right away if any of these occur:     Fever of 100.4 F (38.0 C) or higher, or as directed by your healthcare provider    Redness, swelling, or pain    Foul-smelling fluid coming from the rash  Call 911  Call 911 if any of the following occur:     Swelling of the face, throat, or tongue    Trouble breathing or swallowing    Dizziness, weakness, or fainting  [employer] last reviewed this educational content on 6/1/2019 2000-2020 [employer]. All rights  reserved. This information is not intended as a substitute for professional medical care. Always follow your healthcare professional's instructions.

## 2021-03-24 NOTE — LETTER
March 25, 2021    Phu Loja  [address] [place] [address]      To whom it may concern,    Mr. Loja was seen in clinic on Wed 3/24/21.  He is able to return to work.  He does not have any communicable/infectious diseases or conditions.    Sincerely,          Malena Pinzon MD

## 2021-03-25 ENCOUNTER — TELEPHONE (OUTPATIENT)
Dept: FAMILY MEDICINE | Facility: CLINIC | Age: 36
End: 2021-03-25

## 2021-03-25 NOTE — TELEPHONE ENCOUNTER
Pt called in again as he needs this letter so he can go back to work.  ASAP please!      [facility] phone call message- general phone call:    Reason for call: He seen  yesterday he got a note for his employer but he need a letter to return back to work not one saying he was seen please fax to [phone]    Action desired: call back.    Return call needed: Yes    OK to leave a message on voice mail? Yes    Advised patient to response may take up to 2 business days: Yes       Primary language: English      needed? No    Call taken on March 25, 2021 at 8:15 AM by David Tran

## 2021-04-06 ENCOUNTER — OFFICE VISIT (OUTPATIENT)
Dept: FAMILY MEDICINE | Facility: CLINIC | Age: 36
End: 2021-04-06
Payer: COMMERCIAL

## 2021-04-06 VITALS
RESPIRATION RATE: 20 BRPM | HEART RATE: 99 BPM | OXYGEN SATURATION: 96 % | BODY MASS INDEX: 35.66 KG/M2 | WEIGHT: 238 LBS | DIASTOLIC BLOOD PRESSURE: 84 MMHG | SYSTOLIC BLOOD PRESSURE: 131 MMHG | TEMPERATURE: 98.5 F

## 2021-04-06 DIAGNOSIS — R22.9 LOCALIZED SUPERFICIAL SWELLING, MASS, OR LUMP: Primary | ICD-10-CM

## 2021-04-06 PROCEDURE — 99212 OFFICE O/P EST SF 10 MIN: CPT | Performed by: FAMILY MEDICINE

## 2021-04-14 ENCOUNTER — TELEPHONE (OUTPATIENT)
Dept: FAMILY MEDICINE | Facility: CLINIC | Age: 36
End: 2021-04-14

## 2021-04-14 NOTE — TELEPHONE ENCOUNTER
[facility] phone call message - order or referral request for patient:     Order or referral being requested: leg wound      Additional Comments: patient saw Dr. Pinzon on 4/6 and thought she was going to put in a referral for the wound on his leg.   Patient would like a return call if/when this is placed so he can try and get scheduled right away.     OK to leave a message on voice mail? Yes    Primary language: English      needed? No    Call taken on April 14, 2021 at 11:41 AM by Evangelina Potter

## 2021-04-16 NOTE — PROGRESS NOTES
Assessment & Plan   Attempt at lipoma excision, but base was unable to be freed with blunt dissection and traction as expected.  Concern for deeper muscle/fascial involvement, so procedure was aborted.  Incision closed with 3 sutures.  -- Surgery referral.    Return in 2 weeks for suture removal.    Subjective   Phu Loja is a 35 year old male with a history including urticaria who presents for lipoma removal.    He has a soft, mobile, non-tender mass of the anterior right thigh, with variable echogenicity on point-of-care US, consistent with lipoma.  It bothers him during exercise, so he presents for removal today.    Other: The urticaria from last time has resolved.  It has not recurred since discontinuing antihistamines.    Social: He reports that he has quit smoking. He has never used smokeless tobacco. He reports current alcohol use. He reports that he does not use drugs.    Objective   Vitals: /84   Pulse 99   Temp 98.5  F (36.9  C) (Oral)   Resp 20   Wt 108 kg (238 lb)   SpO2 96%   BMI 35.66 kg/m     Skin: Right anterior thigh with soft, non-tender, mobile 3 cm mass.    Procedure:  Consent: Discussed risks regarding infection and scarring.  Consent signed.  Timeout taken.  Betadine used for cleansing. Lidocaine with epinephrine injected for anesthesia. An incision was made superficial to the mass, and the mass was bluntly dissected along its periphery.  The mass was unable to be successfully freed from the base deeper inside.  It was ultimately NOT excised. 3 simple interrupted 4-0 sutures were placed. Bandage applied. Procedure was tolerated well. No complications.

## 2021-04-19 NOTE — TELEPHONE ENCOUNTER
Referral was placed and faxed to [facility]. Patient was contacted and given the # to schedule/ left on voicemail.      Evangelina Potter

## 2021-04-19 NOTE — PATIENT INSTRUCTIONS
21   GENERAL SURGERY REFERRAL  [facility]  [phone]  Fax: [phone]     Referral, demographics, office note faxed to [phone]. They will contact patient to schedule.     Evangelina Potter

## 2021-05-03 ENCOUNTER — OFFICE VISIT (OUTPATIENT)
Dept: FAMILY MEDICINE | Facility: CLINIC | Age: 36
End: 2021-05-03
Payer: COMMERCIAL

## 2021-05-03 VITALS
DIASTOLIC BLOOD PRESSURE: 82 MMHG | SYSTOLIC BLOOD PRESSURE: 117 MMHG | TEMPERATURE: 98.1 F | RESPIRATION RATE: 16 BRPM | OXYGEN SATURATION: 100 % | HEART RATE: 101 BPM

## 2021-05-03 DIAGNOSIS — Z20.822 EXPOSURE TO COVID-19 VIRUS: Primary | ICD-10-CM

## 2021-05-03 LAB
SARS-COV-2 RNA RESP QL NAA+PROBE: NOT DETECTED
SPECIMEN SOURCE: NORMAL

## 2021-05-03 PROCEDURE — 99212 OFFICE O/P EST SF 10 MIN: CPT | Mod: CS | Performed by: STUDENT IN AN ORGANIZED HEALTH CARE EDUCATION/TRAINING PROGRAM

## 2021-05-03 NOTE — LETTER
[facility] [address] [place] [address]  [phone]      May 3, 2021    RE:  Phu Loja                                                                                                                                                       [address] [place] [address]            To whom it may concern:    Phu Loja is under my professional care for Exposure to COVID-19 virus.   He  may return to work with the following: negative COVID 19 testing and at least 7 days quarantine, or Positive Covid test with 10 days quarantine and at least 24 hours without symptoms.           Sincerely,        Freddy Combs MD    [facility]

## 2021-05-03 NOTE — PROGRESS NOTES
Preceptor Attestation:    I discussed the patient with the resident and evaluated the patient in person. I have verified the content of the note, which accurately reflects my assessment of the patient and the plan of care.   Supervising Physician:  Jeramie Garcia MD.

## 2021-05-03 NOTE — PROGRESS NOTES
Assessment & Plan     Exposure to COVID-19 virus  Patient had exposure to close contact who is now sick and in the hospital with what sounds like COVID-19.  Should his girlfriend tested positive for COVID-19 patient will need to quarantine regardless of his test results today.  Patient's quarantine would last at least 7 days with a negative test 5 days from now given his last contact with sick party was today.  Should he have a positive test he should quarantine for at least 10 days.  Patient provided with this information and provided a letter for his employer.  - COVID-19 Virus PCR [facility] ([facility])      Freddy Combs MD  [facility] ARETHA Bay is a 35 year old who presents for the following health issues     HPI   Patient comes in today for COVID-19 testing after exposure.  His girlfriend is currently sick with shortness of breath cough fever and chills and has been hospitalized at [facility].  Patient is not sure if she has been confirmed COVID-19 or not.  He currently has no symptoms and feels well.    Patient states that a couple months ago his children were sick with COVID-19 confirmed with lab testing, and he became sick with a Covid-like illness but was not tested.  He believes that he has already had Covid because of this.    Patient denies current fever, chills, shortness of breath, chest pain, cough, changes in taste sensation, myalgias, fatigue.    Review of Systems   Constitutional, HEENT, cardiovascular, pulmonary, gi and gu systems are negative, except as otherwise noted.      Objective    /82 (BP Location: Left arm, Patient Position: Sitting, Cuff Size: Adult Regular)   Pulse 101   Temp 98.1  F (36.7  C) (Oral)   Resp 16   SpO2 100%   There is no height or weight on file to calculate BMI.  Physical Exam   GENERAL: healthy, alert and no distress  NECK: no adenopathy, no asymmetry, masses, or scars and thyroid normal to palpation  RESP:  lungs clear to auscultation - no rales, rhonchi or wheezes  CV: regular rate and rhythm, normal S1 S2, no S3 or S4, no murmur, click or rub, no peripheral edema and peripheral pulses strong  MS: no gross musculoskeletal defects noted, no edema  NEURO: Normal strength and tone, mentation intact and speech normal  PSYCH: mentation appears normal, affect normal/bright

## 2021-05-05 ENCOUNTER — TELEPHONE (OUTPATIENT)
Dept: FAMILY MEDICINE | Facility: CLINIC | Age: 36
End: 2021-05-05
Payer: COMMERCIAL

## 2021-05-06 ENCOUNTER — TELEPHONE (OUTPATIENT)
Dept: FAMILY MEDICINE | Facility: CLINIC | Age: 36
End: 2021-05-06

## 2021-05-06 NOTE — TELEPHONE ENCOUNTER
Good news- your COVID test was negative!     If you were tested because you had symptoms:  You most likely do not have COVID. Keep in mind that, although unlikely, false negatives are possible-- meaning that you do have COVID and the test falsely resulted negative. If you continue to have symptoms consistent with COVID (loss of smell, cough, fever, shortness of breath) we recommend you stay in isolation. If you remain symptomatic for >72 hours after time of test, we can repeat COVID-19 testing.    If you were tested due to a past exposure to COVID:  According to CDC guidelines, if you had close exposure to someone with known COVID-19 (within 6 ft of the person for more than 15 min), you need to isolate for either 7, 10, or 14 days depending on which of these applies to you. This is because the virus can take a while to become active in your body after being around someone with COVID. That means that that your test, while negative now, could become positive many days after the exposure.       You should stay away from others for 14 days if:  Someone in your home has COVID-19.  You live in a building with other people, where it s hard to stay away from others and easy to spread the virus to multiple people, like a long-term care facility.        You may consider being around others after 10 days if:  You do not have any symptoms.  You have not had a positive test for COVID-19.  No one in your home has COVID-19, and you do not live in a building with other people, where it s hard to stay away from others and easy to spread the virus to multiple people, like a long-term care facility.    Even after 10 days you must still:  Watch for symptoms through day 14. If you have any symptoms, stay home, separate yourself from others, and get tested right away.  Continue to wear a mask and stay at least 6 feet away from other people.          You may consider being around others after seven days only if:  You get tested for  COVID-19 at least five full days after you had close contact with someone with COVID-19, and the test is negative.  You do not have any symptoms.  You have not had a positive test for COVID-19.  No one in your home has COVID-19, and you do not live in a building with other people, where it s hard to stay away from others and easy to spread the virus to multiple people, like a long-term care facility.    Even after seven days you must still:  Watch for symptoms through day 14. If you have any symptoms, stay home, separate yourself from others, and get tested right away.  Continue to wear a mask and stay at least 6 feet away from other people.        If you were tested due to an ongoing exposure to COVID (you live with or take care of someone with COVID):  Your quarantine is likely longer. Your COVID+ person is infectious until their isolation/quarantine time ends (please talk with your person's care team about when they can come out of isolation, but if they were symptomatic- 10 days after their symptoms began, if they didn't have symptoms- 10 days after their positive test was taken). You need to wait an additional 14 days after the last time you were exposed to this COVID positive person during their quarantine. Remember: an exposure is being within 6 feet of them for more than 15 cumulative minutes in a day (cumulative meaning spending one minute with them fifteen times a day, or 5 minutes three times a day).     In summary, if you are with the COVID positive person every day, and as long as your person has no prolonged fever or symptoms (which can extend their isolation time), you will have to quarantine for:  10 days + 14 days after date of your COVID+ person's initial symptoms for those being exposed to symptomatic patients  10 days + 14 days after date of your COVID+ person's positive test for those being exposed to asymptomatic patients.    We understand this is confusing! The CDC has a great walk through of  exposures and when to quarantine on their website here:  https://www.cdc.gov/coronavirus/2019-ncov/if-you-are-sick/quarantine.html    Thank you for receiving care at [facility]!  Please call the [facility] [phone] or [facility] provider with further questions.

## 2021-05-07 ENCOUNTER — TELEPHONE (OUTPATIENT)
Dept: FAMILY MEDICINE | Facility: CLINIC | Age: 36
End: 2021-05-07
Payer: COMMERCIAL

## 2021-05-07 NOTE — TELEPHONE ENCOUNTER
[facility] phone call message- general phone call:    Reason for call: Pt came in on 05/03 and saw Dr Combs.  He had a Covid test and it came back neg. Dr Combs wrote him a doctors excuse to keep him out for 7-10.  He is going to need an actual return to work date of 05/17.  They only count working days.    Return call needed: Yes    OK to leave a message on voice mail? Yes    Primary language: English      needed? No    Call taken on May 7, 2021 at 3:00 PM by Isai Millard

## 2021-05-07 NOTE — TELEPHONE ENCOUNTER
I recall patient saying he did not want to take time off if possible. His result is negative and I'm assuming he can return to work.    Please advise.      Td Stewart, EMT  3:23 PM  5/7/2021

## 2021-05-07 NOTE — TELEPHONE ENCOUNTER
Message left for pt to return call to verify exposure information to determine date he can return to work    BTRN

## 2021-07-28 ENCOUNTER — OFFICE VISIT (OUTPATIENT)
Dept: FAMILY MEDICINE | Facility: CLINIC | Age: 36
End: 2021-07-28
Payer: COMMERCIAL

## 2021-07-28 ENCOUNTER — TELEPHONE (OUTPATIENT)
Dept: FAMILY MEDICINE | Facility: CLINIC | Age: 36
End: 2021-07-28

## 2021-07-28 VITALS
BODY MASS INDEX: 34.42 KG/M2 | HEIGHT: 69 IN | OXYGEN SATURATION: 95 % | SYSTOLIC BLOOD PRESSURE: 128 MMHG | WEIGHT: 232.4 LBS | RESPIRATION RATE: 16 BRPM | TEMPERATURE: 99.7 F | HEART RATE: 107 BPM | DIASTOLIC BLOOD PRESSURE: 87 MMHG

## 2021-07-28 DIAGNOSIS — R35.1 FREQUENT URINATION AT NIGHT: ICD-10-CM

## 2021-07-28 DIAGNOSIS — R04.2 COUGHING BLOOD: Primary | ICD-10-CM

## 2021-07-28 DIAGNOSIS — E11.9 TYPE 2 DIABETES MELLITUS WITHOUT COMPLICATION, WITHOUT LONG-TERM CURRENT USE OF INSULIN (H): ICD-10-CM

## 2021-07-28 LAB
ALBUMIN SERPL-MCNC: 4.2 G/DL (ref 3.5–5)
ALBUMIN UR-MCNC: NEGATIVE MG/DL
ALP SERPL-CCNC: 148 U/L (ref 45–120)
ALT SERPL W P-5'-P-CCNC: 59 U/L (ref 0–45)
ANION GAP SERPL CALCULATED.3IONS-SCNC: 16 MMOL/L (ref 5–18)
APPEARANCE UR: CLEAR
AST SERPL W P-5'-P-CCNC: 36 U/L (ref 0–40)
BILIRUB SERPL-MCNC: 0.3 MG/DL (ref 0–1)
BILIRUB UR QL STRIP: NEGATIVE
BUN SERPL-MCNC: 15 MG/DL (ref 8–22)
CALCIUM SERPL-MCNC: 10.5 MG/DL (ref 8.5–10.5)
CHLORIDE BLD-SCNC: 98 MMOL/L (ref 98–107)
CO2 SERPL-SCNC: 20 MMOL/L (ref 22–31)
COLOR UR AUTO: YELLOW
CREAT SERPL-MCNC: 1.35 MG/DL (ref 0.7–1.3)
ERYTHROCYTE [DISTWIDTH] IN BLOOD BY AUTOMATED COUNT: 12.2 % (ref 10–15)
GFR SERPL CREATININE-BSD FRML MDRD: 68 ML/MIN/1.73M2
GLUCOSE BLD-MCNC: 470 MG/DL (ref 70–125)
GLUCOSE UR STRIP-MCNC: >=1000 MG/DL
HBA1C MFR BLD: 9.8 % (ref 0–5.6)
HCT VFR BLD AUTO: 46.9 % (ref 40–53)
HGB BLD-MCNC: 16.5 G/DL (ref 13.3–17.7)
HGB UR QL STRIP: NEGATIVE
KETONES UR STRIP-MCNC: ABNORMAL MG/DL
LEUKOCYTE ESTERASE UR QL STRIP: NEGATIVE
MCH RBC QN AUTO: 30.6 PG (ref 26.5–33)
MCHC RBC AUTO-ENTMCNC: 35.2 G/DL (ref 31.5–36.5)
MCV RBC AUTO: 87 FL (ref 78–100)
NITRATE UR QL: NEGATIVE
PH UR STRIP: 5 [PH] (ref 5–8)
PLATELET # BLD AUTO: 306 10E3/UL (ref 150–450)
POTASSIUM BLD-SCNC: 4.5 MMOL/L (ref 3.5–5)
PROT SERPL-MCNC: 8.2 G/DL (ref 6–8)
RBC # BLD AUTO: 5.4 10E6/UL (ref 4.4–5.9)
SARS-COV-2 RNA RESP QL NAA+PROBE: NEGATIVE
SODIUM SERPL-SCNC: 134 MMOL/L (ref 136–145)
SP GR UR STRIP: <=1.005 (ref 1–1.03)
UROBILINOGEN UR STRIP-ACNC: 0.2 E.U./DL
WBC # BLD AUTO: 7.2 10E3/UL (ref 4–11)

## 2021-07-28 PROCEDURE — 36415 COLL VENOUS BLD VENIPUNCTURE: CPT | Performed by: STUDENT IN AN ORGANIZED HEALTH CARE EDUCATION/TRAINING PROGRAM

## 2021-07-28 PROCEDURE — U0005 INFEC AGEN DETEC AMPLI PROBE: HCPCS | Performed by: STUDENT IN AN ORGANIZED HEALTH CARE EDUCATION/TRAINING PROGRAM

## 2021-07-28 PROCEDURE — 85027 COMPLETE CBC AUTOMATED: CPT | Performed by: STUDENT IN AN ORGANIZED HEALTH CARE EDUCATION/TRAINING PROGRAM

## 2021-07-28 PROCEDURE — 81003 URINALYSIS AUTO W/O SCOPE: CPT | Performed by: STUDENT IN AN ORGANIZED HEALTH CARE EDUCATION/TRAINING PROGRAM

## 2021-07-28 PROCEDURE — 80053 COMPREHEN METABOLIC PANEL: CPT | Performed by: STUDENT IN AN ORGANIZED HEALTH CARE EDUCATION/TRAINING PROGRAM

## 2021-07-28 PROCEDURE — 83036 HEMOGLOBIN GLYCOSYLATED A1C: CPT | Performed by: STUDENT IN AN ORGANIZED HEALTH CARE EDUCATION/TRAINING PROGRAM

## 2021-07-28 PROCEDURE — 99214 OFFICE O/P EST MOD 30 MIN: CPT | Mod: GC | Performed by: STUDENT IN AN ORGANIZED HEALTH CARE EDUCATION/TRAINING PROGRAM

## 2021-07-28 PROCEDURE — U0003 INFECTIOUS AGENT DETECTION BY NUCLEIC ACID (DNA OR RNA); SEVERE ACUTE RESPIRATORY SYNDROME CORONAVIRUS 2 (SARS-COV-2) (CORONAVIRUS DISEASE [COVID-19]), AMPLIFIED PROBE TECHNIQUE, MAKING USE OF HIGH THROUGHPUT TECHNOLOGIES AS DESCRIBED BY CMS-2020-01-R: HCPCS | Performed by: STUDENT IN AN ORGANIZED HEALTH CARE EDUCATION/TRAINING PROGRAM

## 2021-07-28 ASSESSMENT — MIFFLIN-ST. JEOR: SCORE: 1975.41

## 2021-07-28 NOTE — LETTER
August 2, 2021      [facility]  [address]   [place] [address]        Dear ,    We are writing to inform you of your test results.     It was nice to meet you in clinic the other day. Good news is that you don't have COVID. The bad news is that your do have elevated blood sugars and a new diagnosis of diabetes. This is probably why you've been more thirsty and urinating more frequently. Please call clinic to set up an appointment so we can discuss treating your diabetes.     Sincerely,      Karen Lewis MD      Resulted Orders   Hemoglobin A1c   Result Value Ref Range    Hemoglobin A1C 9.8 (H) 0.0 - 5.6 %      Comment:      Normal <5.7%   Prediabetes 5.7-6.4%    Diabetes 6.5% or higher     Note: Adopted from ADA consensus guidelines.   CBC with platelets   Result Value Ref Range    WBC Count 7.2 4.0 - 11.0 10e3/uL    RBC Count 5.40 4.40 - 5.90 10e6/uL    Hemoglobin 16.5 13.3 - 17.7 g/dL    Hematocrit 46.9 40.0 - 53.0 %    MCV 87 78 - 100 fL    MCH 30.6 26.5 - 33.0 pg    MCHC 35.2 31.5 - 36.5 g/dL    RDW 12.2 10.0 - 15.0 %    Platelet Count 306 150 - 450 10e3/uL   Comprehensive metabolic panel   Result Value Ref Range    Sodium 134 (L) 136 - 145 mmol/L    Potassium 4.5 3.5 - 5.0 mmol/L    Chloride 98 98 - 107 mmol/L    Carbon Dioxide (CO2) 20 (L) 22 - 31 mmol/L    Anion Gap 16 5 - 18 mmol/L    Urea Nitrogen 15 8 - 22 mg/dL    Creatinine 1.35 (H) 0.70 - 1.30 mg/dL    Calcium 10.5 8.5 - 10.5 mg/dL    Glucose 470 (HH) 70 - 125 mg/dL    Alkaline Phosphatase 148 (H) 45 - 120 U/L    AST 36 0 - 40 U/L    ALT 59 (H) 0 - 45 U/L    Protein Total 8.2 (H) 6.0 - 8.0 g/dL    Albumin 4.2 3.5 - 5.0 g/dL    Bilirubin Total 0.3 0.0 - 1.0 mg/dL    GFR Estimate 68 >60 mL/min/1.73m2      Comment:      As of July 11, 2021, eGFR is calculated by the CKD-EPI creatinine equation, without race adjustment. eGFR can be influenced by muscle mass, exercise, and diet. The reported eGFR is an estimation only  and is only applicable if the renal function is stable.    Narrative    Specimen lipemic, ultracentrifuged to clarify.     SARS-COV2 (COVID-19) Virus RT-PCR   Result Value Ref Range    SARS CoV2 PCR Negative Negative      Comment:      NEGATIVE: SARS-CoV-2 (COVID-19) RNA not detected, presumed negative.    Narrative    Testing was performed using the Xpert Xpress SARS-CoV-2 Assay on the  Cepheid Gene-Xpert Instrument Systems. Additional information about  this Emergency Use Authorization (EUA) assay can be found via the Lab  Guide. This test should be ordered for the detection of SARS-CoV-2 in  individuals who meet SARS-CoV-2 clinical and/or epidemiological  criteria. Test performance is unknown in asymptomatic patients. This  test is for in vitro diagnostic use under the FDA EUA for  laboratories certified under CLIA to perform high complexity testing.  This test has not been FDA cleared or approved. A negative result  does not rule out the presence of PCR inhibitors in the specimen or  target RNA in concentration below the limit of detection for the  assay. The possibility of a false negative should be considered if  the patient's recent exposure or clinical presentation suggests  COVID-19. This test was validated by the [facility]. This laboratory is certified under  the Clinical Laboratory Improvement Amendments of 1988 (CLIA-88) as  qualified to perform high complexity laboratory testing.     UA reflex to Microscopic   Result Value Ref Range    Color Urine Yellow Colorless, Straw, Light Yellow, Yellow    Appearance Urine Clear Clear    Glucose Urine >=1000 (A) Negative mg/dL    Bilirubin Urine Negative Negative    Ketones Urine Trace (A) Negative mg/dL    Specific Gravity Urine <=1.005 1.005 - 1.030    Blood Urine Negative Negative    pH Urine 5.0 5.0 - 8.0    Protein Albumin Urine Negative Negative mg/dL    Urobilinogen Urine 0.2 0.2, 1.0 E.U./dL    Nitrite  Urine Negative Negative    Leukocyte Esterase Urine Negative Negative    Narrative    Microscopic not indicated     If you have any questions or concerns, please call the clinic at the number listed above.

## 2021-07-28 NOTE — PATIENT INSTRUCTIONS
Great to meet you today.     I will call with results as soon as they come in.     Try to limit fluids after 6/7 PM.     If blood in cough worsens or doesn't improve after dental work, please come back.

## 2021-07-28 NOTE — LETTER
RETURN TO WORK FORM    7/28/2021    Re: Phu Loja  1985      To Whom It May Concern:     Phu Loja was seen in clinic today.  He may return to work as soon as lab results have returned, like 7/29 or 7/30.        Restrictions:  None      Karen Lewis MD  7/28/2021 4:03 PM

## 2021-07-28 NOTE — PROGRESS NOTES
Assessment & Plan     Coughing blood  Systemic symptoms include body aches and general feeling of being unwell. He has been COVID vaccinated so low risk but will test. Could be secondary to dentition. Concern for TB- Quant gold pending and he declined CXR today. No concern for systemic illness based on normal WBC.   - Asymptomatic COVID-19 Virus (Coronavirus) by PCR Nasopharyngeal  - Hemoglobin A1c  - CBC with platelets  - Comprehensive metabolic panel  - Quantiferon-TB Gold Plus    Frequent urination at night  Most likely secondary to new diagnosis of diabetes (see below). No signs of UTI.   - UA reflex to Microscopic    Type 2 diabetes mellitus without complication, without long-term current use of insulin (H)  New diagnosis of diabetes with A1C at 9 and glucose seen in urine. Most likely type 2 d/t age of onset and large BMI. He will need an appointment to start treatment and set up with diabetes educator. Could be contributing to his feeling of unwell.       Ordering of each unique test  38 minutes spent on the date of the encounter doing chart review, history and exam, documentation and further activities per the note    Return if symptoms worsen or fail to improve.    Karen Lweis MD PGY3  [facility]    Carmen Bay is a 36 year old who presents for the following health issues  HPI     He states that since Sunday he has been feeling generally unwell. Notes body aches and mild cough. No fevers, chills. In the morning, he states that he coughs up some blood, which goes away throughout the day. He also endorses generalized stomach pain off and on.     Denies any history of stomach ulcers or esophageal issues. No dark or blood stools. No excessive vomiting. He does think that the blood might be coming from his teeth- as his wisdom teeth have been causing him issues and he plans to see a dentist soon.    Additionally, he notes that he has been more thirsty recently- drinking  "lots of water and urinating frequently, especially throughout the night. No burning with urination. No known personal or family history of diabetes.     Review of Systems   Constitutional, HEENT, cardiovascular, pulmonary, gi and gu systems are negative, except as otherwise noted.      Objective    /87 (BP Location: Right arm, Patient Position: Sitting, Cuff Size: Adult Regular)   Pulse 107   Temp 99.7  F (37.6  C) (Oral)   Resp 16   Ht 1.746 m (5' 8.74\")   Wt 105.4 kg (232 lb 6.4 oz)   SpO2 95%   BMI 34.58 kg/m    Body mass index is 34.58 kg/m .  Physical Exam   GENERAL: healthy, alert and no distress  NECK: no adenopathy, no asymmetry, masses, or scars and thyroid normal to palpation  RESP: lungs clear to auscultation - no rales, rhonchi or wheezes  CV: regular rate and rhythm, normal S1 S2, no S3 or S4, no murmur, click or rub, no peripheral edema and peripheral pulses strong  ABDOMEN: soft, nontender, no hepatosplenomegaly, no masses and bowel sounds normal  MS: no gross musculoskeletal defects noted, no edema    Results for orders placed or performed in visit on 07/28/21   Hemoglobin A1c     Status: Abnormal   Result Value Ref Range    Hemoglobin A1C 9.8 (H) 0.0 - 5.6 %   CBC with platelets     Status: Normal   Result Value Ref Range    WBC Count 7.2 4.0 - 11.0 10e3/uL    RBC Count 5.40 4.40 - 5.90 10e6/uL    Hemoglobin 16.5 13.3 - 17.7 g/dL    Hematocrit 46.9 40.0 - 53.0 %    MCV 87 78 - 100 fL    MCH 30.6 26.5 - 33.0 pg    MCHC 35.2 31.5 - 36.5 g/dL    RDW 12.2 10.0 - 15.0 %    Platelet Count 306 150 - 450 10e3/uL   Comprehensive metabolic panel     Status: Abnormal   Result Value Ref Range    Sodium 134 (L) 136 - 145 mmol/L    Potassium 4.5 3.5 - 5.0 mmol/L    Chloride 98 98 - 107 mmol/L    Carbon Dioxide (CO2) 20 (L) 22 - 31 mmol/L    Anion Gap 16 5 - 18 mmol/L    Urea Nitrogen 15 8 - 22 mg/dL    Creatinine 1.35 (H) 0.70 - 1.30 mg/dL    Calcium 10.5 8.5 - 10.5 mg/dL    Glucose 470 (HH) 70 - 125 " mg/dL    Alkaline Phosphatase 148 (H) 45 - 120 U/L    AST 36 0 - 40 U/L    ALT 59 (H) 0 - 45 U/L    Protein Total 8.2 (H) 6.0 - 8.0 g/dL    Albumin 4.2 3.5 - 5.0 g/dL    Bilirubin Total 0.3 0.0 - 1.0 mg/dL    GFR Estimate 68 >60 mL/min/1.73m2    Narrative    Specimen lipemic, ultracentrifuged to clarify.     SARS-COV2 (COVID-19) Virus RT-PCR     Status: Normal    Specimen: Nasopharyngeal; Swab   Result Value Ref Range    SARS CoV2 PCR Negative Negative    Narrative    Testing was performed using the Xpert Xpress SARS-CoV-2 Assay on the  Cepheid Gene-Xpert Instrument Systems. Additional information about  this Emergency Use Authorization (EUA) assay can be found via the Lab  Guide. This test should be ordered for the detection of SARS-CoV-2 in  individuals who meet SARS-CoV-2 clinical and/or epidemiological  criteria. Test performance is unknown in asymptomatic patients. This  test is for in vitro diagnostic use under the FDA EUA for  laboratories certified under CLIA to perform high complexity testing.  This test has not been FDA cleared or approved. A negative result  does not rule out the presence of PCR inhibitors in the specimen or  target RNA in concentration below the limit of detection for the  assay. The possibility of a false negative should be considered if  the patient's recent exposure or clinical presentation suggests  COVID-19. This test was validated by the [facility]. This laboratory is certified under  the Clinical Laboratory Improvement Amendments of 1988 (CLIA-88) as  qualified to perform high complexity laboratory testing.     UA reflex to Microscopic     Status: Abnormal   Result Value Ref Range    Color Urine Yellow Colorless, Straw, Light Yellow, Yellow    Appearance Urine Clear Clear    Glucose Urine >=1000 (A) Negative mg/dL    Bilirubin Urine Negative Negative    Ketones Urine Trace (A) Negative mg/dL    Specific Gravity Urine <=1.005 1.005  - 1.030    Blood Urine Negative Negative    pH Urine 5.0 5.0 - 8.0    Protein Albumin Urine Negative Negative mg/dL    Urobilinogen Urine 0.2 0.2, 1.0 E.U./dL    Nitrite Urine Negative Negative    Leukocyte Esterase Urine Negative Negative    Narrative    Microscopic not indicated   Asymptomatic COVID-19 Virus (Coronavirus) by PCR Nasopharyngeal     Status: Normal    Specimen: Nasopharyngeal; Swab    Narrative    The following orders were created for panel order Asymptomatic COVID-19 Virus (Coronavirus) by PCR Nasopharyngeal.  Procedure                               Abnormality         Status                     ---------                               -----------         ------                     SARS-COV2 (COVID-19) Vir...[[id number]]  Normal              Final result                 Please view results for these tests on the individual orders.

## 2021-07-28 NOTE — PROGRESS NOTES
Preceptor Attestation:   Patient seen, evaluated and discussed with the resident. I have verified the content of the note, which accurately reflects my assessment of the patient and the plan of care.   Supervising Physician:  Malorie Mendez MD

## 2021-07-29 ENCOUNTER — TELEPHONE (OUTPATIENT)
Dept: FAMILY MEDICINE | Facility: CLINIC | Age: 36
End: 2021-07-29

## 2021-07-29 ENCOUNTER — OFFICE VISIT (OUTPATIENT)
Dept: FAMILY MEDICINE | Facility: CLINIC | Age: 36
End: 2021-07-29
Payer: COMMERCIAL

## 2021-07-29 VITALS
HEIGHT: 69 IN | SYSTOLIC BLOOD PRESSURE: 115 MMHG | OXYGEN SATURATION: 96 % | HEART RATE: 105 BPM | TEMPERATURE: 98.3 F | RESPIRATION RATE: 16 BRPM | DIASTOLIC BLOOD PRESSURE: 80 MMHG | WEIGHT: 232.37 LBS | BODY MASS INDEX: 34.42 KG/M2

## 2021-07-29 DIAGNOSIS — E11.9 TYPE 2 DIABETES MELLITUS WITHOUT COMPLICATION, WITHOUT LONG-TERM CURRENT USE OF INSULIN (H): Primary | ICD-10-CM

## 2021-07-29 LAB
CREAT UR-MCNC: 110 MG/DL
MICROALBUMIN UR-MCNC: 27.78 MG/DL (ref 0–1.99)
MICROALBUMIN/CREAT UR: 252.5 MG/G CR

## 2021-07-29 PROCEDURE — 82043 UR ALBUMIN QUANTITATIVE: CPT | Performed by: STUDENT IN AN ORGANIZED HEALTH CARE EDUCATION/TRAINING PROGRAM

## 2021-07-29 PROCEDURE — 99214 OFFICE O/P EST MOD 30 MIN: CPT | Mod: GC | Performed by: STUDENT IN AN ORGANIZED HEALTH CARE EDUCATION/TRAINING PROGRAM

## 2021-07-29 RX ORDER — METFORMIN HCL 500 MG
500 TABLET, EXTENDED RELEASE 24 HR ORAL
Qty: 30 TABLET | Refills: 0 | Status: SHIPPED | OUTPATIENT
Start: 2021-07-29 | End: 2021-08-13

## 2021-07-29 RX ORDER — EXENATIDE 2 MG/.85ML
2 INJECTION, SUSPENSION, EXTENDED RELEASE SUBCUTANEOUS
Qty: 0.85 ML | Refills: 0 | Status: SHIPPED | OUTPATIENT
Start: 2021-07-29 | End: 2021-08-13

## 2021-07-29 ASSESSMENT — MIFFLIN-ST. JEOR: SCORE: 1975.25

## 2021-07-29 NOTE — TELEPHONE ENCOUNTER
[facility] Answering Service Pager Note    I received an answering service page at 21:50 regarding critical lab of glucose 470.     I called Phu and we spoke on the phone in his lab results.  His labs showed a creatinine 1.35, which is showing worsening kidney function from last lab values in 2017, glucose of 470 and A1c of 9.8.  Patient had a lot of questions whether he had diabetes and what he can do to treat that, I counseled that there are therapies and lifestyle changes that he can make but that would be best addressed in a clinic visit.  He states he plans to make a visit by the end of the week.  Asked him regarding symptoms of DKA or HHS, denies abdominal pain, nausea, vomiting, headache, confusion.  His girlfriend with him denied patient being confused or altered. Patient only reported his chronic cough that was present when he came into the clinic today, and polyuria and polydipsia which is consistent with elevated sugars and glucosuria.    Counseled patient that if any red flags as mentioned above were present that were concerning for DKA or HHS, then he is to go to the ER for evaluation.  Concern considering there is a normal anion gap and patient is asymptomatic.    Sridevi stated understanding and plans to make a clinic visit within the coming days, and that if any concerning symptoms are present site he is to go to the ER    Kyree Veronica DO, PGY-2  [facility]

## 2021-07-29 NOTE — LETTER
RETURN TO WORK/SCHOOL FORM    7/29/2021    Re: Phu Loja  1985      To Whom It May Concern:       Phu Loja was seen in clinic today.  Depending on how he feels, he is to remain out of work. He is started on new medications. He may tentatively return to work without restrictions on 8/2/21.  Until then he is to remain out of work.        Restrictions:  Nonefull duty      Manuel Jordan MD  7/29/2021 12:09 PM

## 2021-07-29 NOTE — TELEPHONE ENCOUNTER
Patient is at work and feeling dizzy. He would like to leave but his manager is not allowing him. He requested I schedule him an appointment today and speak with the manager to inform him of his medical issues found yesterday and today's appointment. He handed his phone to his manager and I did so per request. Patient scheduled for an appointment today at 11 am. ./LR

## 2021-07-29 NOTE — PROGRESS NOTES
Preceptor Attestation:    I discussed the patient with the resident and evaluated the patient in person. I have verified the content of the note, which accurately reflects my assessment of the patient and the plan of care.   Supervising Physician:  Rafael Valentino MD.

## 2021-07-29 NOTE — LETTER
RETURN TO WORK/SCHOOL FORM    7/29/2021    Re: Phu Loja  1985      To Whom It May Concern:     Phu Loja was seen in clinic today.  He may return to work without restrictions on 8/2/21.    Until then he is to remain out of work.        Restrictions:  Nonefull duty      Manuel Jordan MD  7/29/2021 12:08 PM

## 2021-07-29 NOTE — PATIENT INSTRUCTIONS
Patient Education     Diabetes Activity Program  [facility] - Exercise Guidelines for Adults with Diabetes  Physical activity may be the best thing you can do to manage your diabetes and improve your health. This is true no matter what your age or fitness level. Through activity, you will lower your blood glucose and help your body use insulin better.  If you've never been active before, it's important to set realistic goals for yourself. Work with your diabetes care team to find an activity plan that's safe for you. Your care team may need to adjust your medicines as you become more active over time.  Getting started  Remember:  1. Talk to a doctor before you increase your activity.  2. Make sure that what you do is right for your level of fitness.  3. Start slowly. Exercise just 5 to 10 minutes a day, if you have been inactive. Do more only as you are able.  Choose activities that you enjoy. If you have arthritis or other joint problems, try swimming, water aerobics, chair exercises or other exercise that increases your heart rate without stressing your joints.  Walking is often a good way to get started. It is easy and cheap--all you need are good socks and a pair of supportive shoes that fit well. If you use the shoes often, plan to buy a new pair at least twice a year for good support.  Tips    Try to do some type of aerobic activity every other day for 30 minutes or more. Aerobic refers to any activity that makes you breathe harder and keeps your heart rate up for several minutes at a time.    To prevent injury, it helps to warm up before activity and cool down afterward.    Drink plenty of water before, during and after activity.    Carry a carbohydrate snack with you in case of low blood sugar. (For example, a small box of raisins, a 4-ounce juice box or a small piece of fruit.)    Always wear or carry ID that says you have diabetes.    Look for ways to stay motivated. Exercise with a partner or reward  your success.    Try to increase your general activity level throughout the day. (For example, take the stairs instead of the elevator or park your car at the far end of the parking lot.) This will help you burn calories and get (or stay) fit.If you have questions about your diabetes care, call your diabetes educator.  Control your blood glucose during activity  Your glucose level can drop during activity or many hours later. This is especially true if you take certain diabetes medicines. You may need to take extra steps to prevent low blood glucose.    Test your glucose before and after activity. This will tell you how your body responds to exercise.    If you take insulin, sulfonureas or meglitinides: Your glucose should be above 100 before you begin. (If you aren't sure what kind of medicine you take, call your diabetes educator.)    If you have type 1 diabetes: Your glucose should be below 240 before you begin. If you have ketones, wait for them to clear before you do any activity. (If your doctor or nurse gives you different instructions, follow his or her advice.)    At the first sign of low blood glucose, stop your activity and treat the low glucose.    Avoid activity when you are ill.    Avoid activity just before bedtime.    If you have foot sores or severe numbness or tingling, do non-weight bearing activities (such as biking or swimming). Tell your care team about any blisters or foot problems.    If you take insulin, avoid activity while the insulin is peaking (working at its strongest level). Do not inject into the area you plan to exercise the most.  Goals for activity    30 minutes every other day    Get to a level of 4 to 6 on the effort scale    Do strength-training 2 to 3 days per week  Notes: ____________________________________  __________________________________________  __________________________________________  Talk to your doctor before starting an activity program  This is very important  if:    You are over age 35.    You have had type 1 diabetes for over 15 years.    You have had type 2 diabetes for over 10 years.    You have any risk factors for heart or artery disease (such as high blood pressure, high cholesterol or being overweight).    You have a history of heart or artery disease.    You have any kind of nerve damage (neuropathy).    You have eye disease (retinopathy).

## 2021-07-29 NOTE — TELEPHONE ENCOUNTER
[facility] phone call message-patient reporting a symptom:     Symptom:    Pt received a call last night about his blood sugars being too high. He states he is at work and a dizzy. He wanting to leave for today, but needing to speak to a nurse first.     Same Day Visit Offered: No    Additional comments:     None      OK to leave message on voice mail? Yes    Primary language: English      needed? No    Call taken on July 29, 2021 at 8:17 AM by Smiley Dickerson

## 2021-07-29 NOTE — PROGRESS NOTES
"There are no exam notes on file for this visit.  Chief Complaint   Patient presents with     RECHECK     DM result/ pt complains having back adn stomach pain, been throup up and dizzy .     Blood pressure 115/80, pulse 105, temperature 98.3  F (36.8  C), temperature source Oral, resp. rate 16, height 1.746 m (5' 8.74\"), weight 105.4 kg (232 lb 5.8 oz), SpO2 96 %.           JANET Bay is a 36 year old  male with a PMH significant for:   There is no problem list on file for this patient.    He presents with follow-up for newly diagnosed type 2 diabetes mellitus, patient presented to clinic yesterday for respiratory symptoms, generalized body aches, abdominal pain, nausea, vomiting, polyuria, polydipsia found on diagnosed on lab testing with diabetes Mellitus.  Discussed results with patient in clinic today, missed work today because of similar symptoms.  Has a mild headache, abdominal pain, polyuria, polydipsia.  Patient has had significant weight gain in the last year states that he has gained about 60 pounds.         OBJECTIVE     Vitals:    07/29/21 1118   BP: 115/80   BP Location: Right arm   Patient Position: Sitting   Cuff Size: Adult Large   Pulse: 105   Resp: 16   Temp: 98.3  F (36.8  C)   TempSrc: Oral   SpO2: 96%   Weight: 105.4 kg (232 lb 5.8 oz)   Height: 1.746 m (5' 8.74\")     Body mass index is 34.57 kg/m .    Constitutional: Awake, alert, cooperative, no acute distress, and appears stated age.  Eyes: sclera clear, conjunctiva normal.  ENT: NC/AT.   Neck: Supple, symmetrical, trachea midline. No submandibular LAD.  Back: Symmetric, no curvature  Lungs: No increased WOB. CTABL, no crackles or wheezing appreciated.  Cardiovascular: Appears well perfused. RRR, normal S1 and S2, no S3 or S4, and no murmur appreciated.  Abdomen: Normal BS, soft, non-distended, non-tender, no masses palpated  Musculoskeletal: No redness, warmth, or swelling of the joints. Tone is normal.  Neurologic: A&Ox3.  " Cranial nerves II-XII are grossly intact.  Sensory is intact and gait is normal.  Neuropsychiatric: Normal affect, mood, orientation, memory and insight.  Skin: No visible rashes, erythema, pallor, petechia or purpura.    No results found for any visits on 07/29/21.    ASSESSMENT AND PLAN     Phu was seen today for recheck.    Diagnoses and all orders for this visit:    Type 2 diabetes mellitus without complication, without long-term current use of insulin (H)  -     Albumin Random Urine Quantitative with Creat Ratio  -     metFORMIN (GLUCOPHAGE-XR) 500 MG 24 hr tablet; Take 1 tablet (500 mg) by mouth daily (with dinner)  -     [facility] Adult Diabetes Educator Referral; Future  -     exenatide ER (BYDUREON BCISE) 2 MG/0.85ML auto-injector; Inject 2 mg Subcutaneous every 7 days  -     Glucose By Meter - Accucheck ([facility])  -     blood glucose (NO BRAND SPECIFIED) test strip; Use to test blood sugar 1 times daily or as directed in the morning.  -     blood glucose monitoring (NO BRAND SPECIFIED) meter device kit; Use to test blood sugar 1 times daily or as directed.    Discussed to new diagnosis of diabetes, no family history of type 2 diabetes.  Found to have an A1c of 9.8, hyperglycemic to 470, Cr to 1.3 possible CKD 2/2 DM. Discussed diet and exercise changes with patient.  Patient in understanding that he needs to lose weight, increase exercise, decrease intake of alcohol, sugary drinks, other high-calorie foods.  Patient has hesitation to starting insulin, dislike of needles and suspicion of side effects of insulin.  Agreeable to start on Bydureon, weekly injections.  Start Metformin at 500 mg daily, increase dose at next visit in 2 weeks. Consider initiation of ACEi at follow up, increase Metformin dosage to 1000 mg. Referral to diabetes educator placed, on next visit would benefit from meeting with pharmacist in clinic.      Return in about 2 weeks (around 8/12/2021).    Manuel Jordan MD  7/29/2021

## 2021-07-29 NOTE — TELEPHONE ENCOUNTER
P Answering Service Pager Note    I received an answering service page at [phone] regarding Blood sugar question.    I called Phu and we spoke on the phone.  Cl was just diagnosed with diabetes earlier today and provided with a glucometer to check his blood sugars.  He checked just now and his blood sugar was elevated greater than 300.  He denies having any excessive thirst, nausea, vomiting, otherwise discomfort.  This is the first time he has checked his blood sugar and is wondering what normal should be.  Patient has not taken the bydurion or Metformin that was prescribed for him earlier today.    Patient was counseled on diet and exercise and advised to eat a healthy meal tonight.  Given that he has no symptoms of acidosis or DKA.  There is no need for urgent or emergent evaluation.  Patient advised to continue checking his blood sugars, take his medications, and call back with any further questions.    Patient stated understanding concluding the encounter.    Freddy Combs MD  PGY3  [facility]  Pager: [phone]

## 2021-07-30 ENCOUNTER — TELEPHONE (OUTPATIENT)
Dept: FAMILY MEDICINE | Facility: CLINIC | Age: 36
End: 2021-07-30

## 2021-07-30 NOTE — TELEPHONE ENCOUNTER
Diabetes Education Scheduling Outreach #1:    Call to patient to schedule. Left message with phone number to call to schedule.    Plan for 2nd outreach attempt within 1 week.    Yuli Dickerson  [facility] [employer]  Diabetes and Nutrition Scheduling

## 2021-08-13 ENCOUNTER — OFFICE VISIT (OUTPATIENT)
Dept: FAMILY MEDICINE | Facility: CLINIC | Age: 36
End: 2021-08-13
Payer: COMMERCIAL

## 2021-08-13 ENCOUNTER — DOCUMENTATION ONLY (OUTPATIENT)
Dept: FAMILY MEDICINE | Facility: CLINIC | Age: 36
End: 2021-08-13

## 2021-08-13 VITALS
RESPIRATION RATE: 16 BRPM | BODY MASS INDEX: 34.36 KG/M2 | HEIGHT: 69 IN | SYSTOLIC BLOOD PRESSURE: 120 MMHG | TEMPERATURE: 98.9 F | WEIGHT: 232 LBS | HEART RATE: 102 BPM | DIASTOLIC BLOOD PRESSURE: 80 MMHG | OXYGEN SATURATION: 94 %

## 2021-08-13 DIAGNOSIS — E11.9 TYPE 2 DIABETES MELLITUS WITHOUT COMPLICATION, WITHOUT LONG-TERM CURRENT USE OF INSULIN (H): ICD-10-CM

## 2021-08-13 DIAGNOSIS — E11.69 TYPE 2 DIABETES MELLITUS WITH OTHER SPECIFIED COMPLICATION, WITHOUT LONG-TERM CURRENT USE OF INSULIN (H): Primary | ICD-10-CM

## 2021-08-13 PROCEDURE — 99214 OFFICE O/P EST MOD 30 MIN: CPT | Mod: GC | Performed by: STUDENT IN AN ORGANIZED HEALTH CARE EDUCATION/TRAINING PROGRAM

## 2021-08-13 RX ORDER — LISINOPRIL 2.5 MG/1
2.5 TABLET ORAL DAILY
Qty: 60 TABLET | Refills: 3 | Status: SHIPPED | OUTPATIENT
Start: 2021-08-13 | End: 2021-10-27

## 2021-08-13 RX ORDER — EXENATIDE 2 MG/.85ML
2 INJECTION, SUSPENSION, EXTENDED RELEASE SUBCUTANEOUS
Qty: 0.85 ML | Refills: 0 | Status: SHIPPED | OUTPATIENT
Start: 2021-08-13 | End: 2022-05-04

## 2021-08-13 RX ORDER — METFORMIN HCL 500 MG
500 TABLET, EXTENDED RELEASE 24 HR ORAL
Qty: 60 TABLET | Refills: 3 | Status: SHIPPED | OUTPATIENT
Start: 2021-08-13 | End: 2021-09-02 | Stop reason: SINTOL

## 2021-08-13 ASSESSMENT — MIFFLIN-ST. JEOR: SCORE: 1976.11

## 2021-08-13 NOTE — PATIENT INSTRUCTIONS
Patient Education     Diabetes and Chronic Kidney Disease (CKD)  How does diabetes affect my body?  When diabetes is not well controlled, the sugar level in your blood goes up. Many different parts of your body are affected when your blood sugar is high: kidneys, blood vessels, heart, eyes, feet and nerves. Diabetes can also cause high blood pressure, which can also damage your kidneys.  How does diabetes harm the kidneys?  Your kidneys filter waste from your body. With diabetes, these filters are damaged. This happens because diabetes injures the blood vessels in your kidneys and they can't clean your blood as well as they should. The waste will build up in your blood.  Diabetes can also injure the nerves in your body. This may make it hard for you to empty your bladder. A full bladder can put stress on the kidneys or cause an infection.  Do people with diabetes have a greater risk for getting kidney disease?  YES. Diabetes is the number one cause of chronic kidney disease (CKD). About 1 out of 3 people with diabetes may get CKD. Certain groups may have a higher risk of getting kidney disease than others. Your risk may be higher if you:    Have high blood pressure    Have poorly controlled blood sugar levels    Have a family member with CKD.  How can I slow the progression of CKD?    Know your blood sugar and Alc goals.    Control your blood sugar with the help of your diabetes care team:  ? Dietician  ? Diabetic educator  ? Nurse  ? Doctor    Keep your blood pressure under control.  ? Know your blood pressure goals.  ? Take your medicines as directed.  ? Check your blood pressure at home as directed.  ? Do not stop taking your medicine when your blood pressure is under control. You still need to take medicine to maintain control.  ? Do NOT smoke.  ? Call your care team if you have questions.    Follow the exercisse and diet guidelines your care team gives you.  Pregnancy and diabetes  Having CKD and diabetes is  serious. If you are thinking about getting pregnant, please tell your care team.  Amos:    Diabetes is the leading cause of CKD.    1 out of 3 people with diabetes may develop kidney failure.    Controlling diabetes can prevent CKD from getting worse.  Other resources  National Kidney Foundation   www.kidney.org  8-405-502-8254  For informational purposes only. Not to replace the advice of your health care provider.   Copyright   2016 [facility]. All rights reserved. [employer] 164553 - 03/16.  For informational purposes only. Not to replace the advice of your health care provider.  Copyright   2018 [facility]. All rights reserved.         Metformin  - Take 1 pill a day with your biggest meal for 1 week; if no symptoms then  - Take 2 pills day day, one in the morning and one at night for 1 week; if no symptoms then  - Take 3 pills day day, one in the morning and two at night for 1 week; if no symptoms then  - Take 4 pills day day, two in the morning and two at night for 1 week; if no symptoms then

## 2021-08-13 NOTE — PROGRESS NOTES
"Preceptor attestation:  Vital signs reviewed: /80 (BP Location: Left arm, Patient Position: Sitting, Cuff Size: Adult Large)   Pulse 102   Temp 98.9  F (37.2  C) (Oral)   Resp 16   Ht 1.75 m (5' 8.9\")   Wt 105.2 kg (232 lb)   SpO2 94%   BMI 34.36 kg/m      Patient seen, evaluated, and discussed with the resident.  I have verified the content of the note, which accurately reflects my assessment of the patient and the plan of care.    Supervising physician: Malena Pinzon MD  [facility]  "

## 2021-08-13 NOTE — PROGRESS NOTES
"There are no exam notes on file for this visit.  Chief Complaint   Patient presents with     Follow Up     stop taking BP medication for a few days due to the [facility] pt work.     Forms     FMLA form to be signed and faxed.     Blood pressure 120/80, pulse 102, temperature 98.9  F (37.2  C), temperature source Oral, resp. rate 16, height 1.75 m (5' 8.9\"), weight 105.2 kg (232 lb), SpO2 94 %.           JANET Bay is a 36 year old  male with a PMH significant for:   There is no problem list on file for this patient.    He presents for follow up for T2DM.    Diabetes Follow-up    Patient is checking blood sugars: once daily.  Results are as follows:         am - 250s         -Last A1C was   Lab Results   Component Value Date    A1C 9.8 07/28/2021        Diabetic concerns: None and blood sugar frequently over 200    Chest Pain or exercise related calf pain (claudication):no     Symptoms of hypoglycemia (low blood sugar): none     Paresthesias (numbness or burning in feet) or sores: No     Diabetic eye exam within the last year?: No      Adherence and Exercise  Medication side effects: yes: appetite change, GI upset, and lethargy after metformin  How often is a medication missed? Taking weekly bydureon x2, metformin discontinues after side effects  Exercise:walking  and strength training 2-3 days/week for an average of 30-45 minutes             OBJECTIVE     Vitals:    08/13/21 1617 08/13/21 1619   BP: 138/87 120/80   BP Location: Right arm Left arm   Patient Position: Sitting Sitting   Cuff Size: Adult Large Adult Large   Pulse: 102    Resp: 16    Temp: 98.9  F (37.2  C)    TempSrc: Oral    SpO2: 94%    Weight: 105.2 kg (232 lb)    Height: 1.75 m (5' 8.9\")      Body mass index is 34.36 kg/m .    Constitutional: Awake, alert, cooperative, no acute distress, and appears stated age.  Eyes: sclera clear, conjunctiva normal.  Neck: Supple, symmetrical, trachea midline. No submandibular LAD.  Back: Symmetric, " no curvature  Lungs: No increased WOB. CTABL, no crackles or wheezing appreciated.  Cardiovascular: Appears well perfused. RRR, normal S1 and S2, no S3 or S4, and no murmur appreciated.  Abdomen: Normal BS, soft, non-distended, non-tender, no masses palpated  Musculoskeletal: No redness, warmth, or swelling of the joints. Tone is normal.  Neurologic: A&Ox3.  Cranial nerves II-XII are grossly intact.  Sensory is intact and gait is normal.  Neuropsychiatric: Normal affect, mood, orientation, memory and insight.  Skin: No visible rashes, erythema, pallor, petechia or purpura.    No results found for any visits on 08/13/21.    ASSESSMENT AND PLAN     Phu was seen today for follow up and forms.    Diagnoses and all orders for this visit:    Type 2 diabetes mellitus with other specified complication, without long-term current use of insulin (H)  -     empagliflozin (JARDIANCE) 10 MG TABS tablet; Take 1 tablet (10 mg) by mouth daily  -     lisinopril (ZESTRIL) 2.5 MG tablet; Take 1 tablet (2.5 mg) by mouth daily    Type 2 diabetes mellitus without complication, without long-term current use of insulin (H)  -     metFORMIN (GLUCOPHAGE-XR) 500 MG 24 hr tablet; Take 1 tablet (500 mg) by mouth daily (with dinner)  -     exenatide ER (BYDUREON BCISE) 2 MG/0.85ML auto-injector; Inject 2 mg Subcutaneous every 7 days    Continue with Bydureon weekly, no concerns about medication use. Will re-initiate metformin from 1 pill/day increasing by 500 mg weekly. Will try to take metformin before his nap as patient works late shifts. Main concern is lethargy side effects from metformin. Microalbuminuria and elevation in Cr at last visit, agreeable to iniate lisinopril and Jardiance. Increase as tolerated by blood pressure and blood glucoses.       Return in about 4 weeks (around 9/10/2021).    Manuel Jordan MD  8/13/2021

## 2021-08-13 NOTE — PROGRESS NOTES
To be completed in Nursing note:    Please reference list for forms that require a visit for completion.  Please remind patients that providers are given 3-5 business days to complete and return forms.      Form type: José Miguel: CHRISSYLA form    Date form received: 2021    Date form completed by Physician: 2021    How was form returned to patient (mailed, faxed, or at  for patient to ): Faxed to [phone]    Date form mailed/faxed/left at  for patient and sent to HIM for [email or web address]      Once form is left for patient, faxed, or mailed PCS will then close the documentation only encounter.

## 2021-08-30 ENCOUNTER — TELEPHONE (OUTPATIENT)
Dept: FAMILY MEDICINE | Facility: CLINIC | Age: 36
End: 2021-08-30

## 2021-08-30 NOTE — TELEPHONE ENCOUNTER
[facility] phone call message- general phone call:    Reason for call: Pt would like to speak with Dr Jordan regarding FMLA and some records regarding his leg.      Return call needed: Yes    OK to leave a message on voice mail? Yes    Primary language: English      needed? No    Call taken on August 30, 2021 at 9:15 AM by Isai Millard

## 2021-08-30 NOTE — TELEPHONE ENCOUNTER
Spoke with patient that Dr. Jordan cannot fill out his FMLA form unless he sees the doctor again.  Patient wants to come tomorrow, no available schedule.  Appointment with his primary physician Dr. Benito on Thursday 9/2/2021 at 3:10

## 2021-09-02 ENCOUNTER — OFFICE VISIT (OUTPATIENT)
Dept: FAMILY MEDICINE | Facility: CLINIC | Age: 36
End: 2021-09-02
Payer: COMMERCIAL

## 2021-09-02 VITALS
OXYGEN SATURATION: 96 % | TEMPERATURE: 98.5 F | HEART RATE: 111 BPM | SYSTOLIC BLOOD PRESSURE: 113 MMHG | RESPIRATION RATE: 16 BRPM | WEIGHT: 231.6 LBS | BODY MASS INDEX: 34.3 KG/M2 | DIASTOLIC BLOOD PRESSURE: 79 MMHG

## 2021-09-02 DIAGNOSIS — E11.9 TYPE 2 DIABETES MELLITUS WITHOUT COMPLICATION, WITHOUT LONG-TERM CURRENT USE OF INSULIN (H): Primary | ICD-10-CM

## 2021-09-02 PROCEDURE — 99214 OFFICE O/P EST MOD 30 MIN: CPT | Mod: GC | Performed by: STUDENT IN AN ORGANIZED HEALTH CARE EDUCATION/TRAINING PROGRAM

## 2021-09-02 NOTE — LETTER
RETURN TO WORK/SCHOOL FORM    9/2/2021    Re: Phu FRASER Purvi  1985      To Whom It May Concern:     Phu Loja was seen in clinic today. Please excuse him from work until 9/9/21.                 Stephanie Benito MD  9/2/2021 4:18 PM

## 2021-09-02 NOTE — PROGRESS NOTES
"    Assessment & Plan     Type 2 diabetes mellitus without complication, without long-term current use of insulin (H)  Phu is a 36-year-old male with recently diagnosed type 2 diabetes on Metformin and Bydureon presenting for lower abdominal pain, groin pain, and lumps on his left thigh concerning for medication side effects.  Given that his lower abdominal pain with radiation to the groin started immediately after beginning Metformin and patient has had 3-4 episodes of diarrhea per week shortly after taking Metformin, I think it is more likely that side effects are caused by Metformin.  Recommended stopping Metformin and monitoring symptoms.  Patient has been injecting Bydureon into his anterior left thigh, which correlates with the subcutaneous nodules on exam at the sites of previous injections.  Patient educated on proper sites of injection in the abdomen, and recommended rotating sides of the abdomen between injections.  Patient indicated understanding.  Patient is requesting FMLA beginning on 8/17/2021 through present due to symptoms as discussed above.  This is difficult as he has not been seen since 8/13/2021, and it is unclear how well his side effects have been communicated with the medical team.  Provided patient with letter excusing him from work from today until 9/9/2021.  Patient states that some paperwork has been filled out by another provider.  Recommended that he continue to hold the Jardiance as he has not started it yet and it is unclear what his symptoms are caused by. Recommended that he stop Metformin as above and return for reassessment on 9/8/2021, with further letters and paperwork to be discussed at that time.      Diagnosis or treatment significantly limited by social determinants of health - Limited health literacy and low income    BMI:   Estimated body mass index is 34.3 kg/m  as calculated from the following:    Height as of 8/13/21: 1.75 m (5' 8.9\").    Weight as of this " SICU DAILY PROGRESS NOTE    KAYLA PA is a 51y Male PMHx of ESRD on HD  (via LUE AVF), HTN, Gout, Glaucoma, PNET s/p robotic distal panc/splenectomy (at [facility] 2015 by Dr. Young, followed by Dr. Raphael, [facility] Oncologist), RA with hand contractures. He underwent DDRT on 12/8/19 (ureteral stent sutured to Kennedy - removed 12/15). Post op course c/b DGF requiring HD, thrombocytopenia, elevated LDH and Haptoglobulin. Schistocytes  on peripheral smear concerning for TMA. Envarsus held. Received Belatacept 12/13. Started on PLEX (12/12, 12/15). Underwent renal bx on 12/13 c/w profound ATN.  Found to have much improvement to levels, likely related to Tacrolimus. He was discharged home on 12/20/19 w/ outpatient HD.     He was now sent to ED by dialysis center after he was found to be febrile to 102, he did not get HD this AM, and last full HD session was on 12/26. Upon arrival, he was febrile to 101.7, other vitals were stable. He states that he had a cough that began 5-6 days ago that has now mostly resolved. He reports no other febrile episodes other than this morning. He admits to having one SOB episode earlier this week that resolved after he got HD. He also states that his mother was hospitalized earlier this week and was flu+. Currently makes less than 1 cup urine/daily. He currently denies SOB, CP, dyspnea, HA, dizziness, N/V, diarrhea, constipation.     1/1/2020 was taken to OR and found to have a perinephric hematoma. Underwent washout, evacuation of hematoma, and biopsy of transplanted kidney. He was recovering on the floor when overnight he spiked a fever to 38.1 and had 18 beats of wide complex tachycardia. He was also found to have worsening leukocytosis and became acutely hypotensive. A CT scan was obtained demonstrating a 4.5x3.7 perinephric collection. IR was consulted for drainage and SICU was consulted for hemodynamic monitoring.    24 HOUR EVENTS:  - Given additional dose of amikacin  - RLE duplex demonstrating R ext iliac and common fem DVT  - Started on therapeutic hep gtt, vascular surgery consulted  - Continues on CBI as per urology    SUBJECTIVE/ROS:  [ ] A ten-point review of systems was otherwise negative except as noted.  [ ] Due to altered mental status/intubation, subjective information were not able to be obtained from the patient. History was obtained, to the extent possible, from review of the chart and collateral sources of information.      NEURO  RASS:     GCS:     CAM ICU:  Exam: awake, alert, oriented  Meds: acetaminophen   Tablet .. 650 milliGRAM(s) Oral every 6 hours PRN Mild Pain (1 - 3)  gabapentin 100 milliGRAM(s) Oral two times a day  traMADol 50 milliGRAM(s) Oral every 6 hours PRN Severe Pain (7 - 10)  traMADol 25 milliGRAM(s) Oral every 6 hours PRN Moderate Pain (4 - 6)    [x] Adequacy of sedation and pain control has been assessed and adjusted      RESPIRATORY  RR: 17 (01-09-20 @ 03:00) (13 - 30)  SpO2: 100% (01-09-20 @ 03:00) (96% - 100%)  Wt(kg): --  Exam: unlabored, equal chest rise  Mechanical Ventilation:     [N/A] Extubation Readiness Assessed  Meds:       CARDIOVASCULAR  HR: 62 (01-09-20 @ 03:00) (62 - 98)  BP: 127/69 (01-09-20 @ 03:00) (94/52 - 179/97)  BP(mean): 94 (01-09-20 @ 03:00) (66 - 132)  ABP: --  ABP(mean): --  Wt(kg): --  CVP(cm H2O): --  VBG - ( 08 Jan 2020 01:39 )  pH: 7.46  /  pCO2: 40    /  pO2: 55    / HCO3: 28    / Base Excess: 3.9   /  SaO2: 87     Lactate: 1.2      Exam: regular rate and rhythm  Cardiac Rhythm: sinus  Perfusion     [x]Adequate   [ ]Inadequate  Mentation   [x]Normal       [ ]Reduced  Extremities  [x]Warm         [ ]Cool  Volume Status [ ]Hypervolemic [x]Euvolemic [ ]Hypovolemic  Meds:       GI/NUTRITION  Exam: soft, nontender, mildly distended, incision dressed, drain in place without output  Diet: regular renal diet  Meds: famotidine    Tablet 20 milliGRAM(s) Oral daily  polyethylene glycol 3350 17 Gram(s) Oral daily  senna 2 Tablet(s) Oral at bedtime      GENITOURINARY  I&O's Detail    01-07 @ 07:01 - 01-08 @ 07:00  --------------------------------------------------------  IN:    Continuous Bladder Irrigation: 29620 mL    dextrose 5% + sodium chloride 0.9%: 30 mL    dextrose 5% + sodium chloride 0.9%: 550 mL    Instillation: 3000 mL    Oral Fluid: 700 mL    Other: 800 mL    Packed Red Blood Cells: 700 mL    Solution: 500 mL  Total IN: 54497 mL    OUT:    Other: 1300 mL  Total OUT: 1300 mL    Total NET: 35247 mL      01-08 @ 07:01 - 01-09 @ 03:35  --------------------------------------------------------  IN:    Continuous Bladder Irrigation: 86667 mL    heparin Infusion: 130 mL    Oral Fluid: 400 mL    Solution: 150 mL  Total IN: 36920 mL    OUT:    Continuous Bladder Irrigation: 4000 mL    Indwelling Catheter - Urethral: 30 mL  Total OUT: 4030 mL    Total NET: 07050 mL          01-08    135  |  94<L>  |  26<H>  ----------------------------<  95  4.7   |  24  |  5.13<H>    Ca    8.4      08 Jan 2020 00:29  Phos  4.5     01-08  Mg     1.9     01-08    TPro  5.9<L>  /  Alb  2.4<L>  /  TBili  1.0  /  DBili  0.4<H>  /  AST  21  /  ALT  6<L>  /  AlkPhos  184<H>  01-08    [ ] Kennedy catheter, indication: N/A  Meds: oxybutynin 10 milliGRAM(s) Oral every 8 hours        HEMATOLOGIC  Meds: heparin  Infusion 1000 Unit(s)/Hr IV Continuous <Continuous>    [x] VTE Prophylaxis                        9.3    32.69 )-----------( 504      ( 08 Jan 2020 21:35 )             25.9     PT/INR - ( 08 Jan 2020 00:29 )   PT: 14.9 sec;   INR: 1.30 ratio         PTT - ( 08 Jan 2020 21:35 )  PTT:47.4 sec  Transfusion     [ ] PRBC   [ ] Platelets   [ ] FFP   [ ] Cryoprecipitate      INFECTIOUS DISEASES  WBC Count: 32.69 K/uL (01-08 @ 21:35)  WBC Count: 29.86 K/uL (01-08 @ 09:04)    RECENT CULTURES:  Specimen Source: .Blood Blood-Venous  Date/Time: 01-07 @ 13:23  Culture Results:   No growth to date.  Gram Stain: --  Organism: --  Specimen Source: .Urine Catheterized  Date/Time: 01-07 @ 00:18  Culture Results:   <10,000 CFU/mL Normal Urogenital Gillian  Gram Stain: --  Organism: --  Specimen Source: Abdominal Fl Abdominal Fluid  Date/Time: 01-06 @ 21:03  Culture Results:   No growth  Gram Stain:   No polymorphonuclear leukocytes seen  No organisms seen  by cytocentrifuge  Organism: --  Specimen Source: .Urine Clean Catch (Midstream)  Date/Time: 01-05 @ 13:59  Culture Results:   >100,000 CFU/ml Enterobacter cloacae  Gram Stain: --  Organism: Enterobacter cloacae  Specimen Source: .Blood Blood  Date/Time: 01-04 @ 13:07  Culture Results:   No growth to date.  Gram Stain: --  Organism: --    Meds: fluconAZOLE   Tablet 400 milliGRAM(s) Oral daily  meropenem  IVPB 500 milliGRAM(s) IV Intermittent every 24 hours  trimethoprim   80 mG/sulfamethoxazole 400 mG 1 Tablet(s) Oral daily  valGANciclovir 450 milliGRAM(s) Oral <User Schedule>        ENDOCRINE  CAPILLARY BLOOD GLUCOSE        Meds: allopurinol 100 milliGRAM(s) Oral daily  predniSONE   Tablet 5 milliGRAM(s) Oral daily        ACCESS DEVICES:  [ ] Peripheral IV  [ ] Central Venous Line	[ ] R	[ ] L	[ ] IJ	[ ] Fem	[ ] SC	Placed:   [ ] Arterial Line		[ ] R	[ ] L	[ ] Fem	[ ] Rad	[ ] Ax	Placed:   [ ] PICC:					[ ] Mediport  [ ] Urinary Catheter, Date Placed:   [x] Necessity of urinary, arterial, and venous catheters discussed    OTHER MEDICATIONS:  brimonidine 0.2% Ophthalmic Solution 1 Drop(s) Both EYES three times a day  chlorhexidine 2% Cloths 1 Application(s) Topical <User Schedule>  latanoprost 0.005% Ophthalmic Solution 1 Drop(s) Both EYES daily  lidocaine 2% Jelly 1 milliLiter(s) IntraUrethral two times a day PRN      CODE STATUS:      IMAGING: encounter: 105.1 kg (231 lb 9.6 oz).   Weight management plan: Discussed healthy diet and exercise guidelines    Return in about 1 week (around 9/9/2021).    Patient was discussed with attending physician, Dr. Jeremiah Jacobsen MD, who agrees with the assessment and plan.    Stephanie Benito MD, PGY-2  [facility] Medicine Residency  9/2/2021      Subjective   Phu Loja is a 36 year old male who presents for the following health issues     Medication problem and paperwork    Phu was seen on 7/28/2021 for newly diagnosed diabetes mellitus.  He was seen again on 7/29/2021 and started on Bydureon 2 mg every 7 days and Metformin 500 mg daily titrated to 1000 mg daily.  He was then seen on 8/13/2021 for diabetes follow-up, when appetite change, GI upset, and lethargy after Metformin were noted as side effects.    Since starting Bydureon and Metformin, he has developed lower abdominal pain and groin pain which started the day after he started the medications. He is injecting Bydureon 2 mg in his left thigh, and has noticed lumps under his skin at the sights of previous injections. He is taking metformin 1000 mg XL with supper, and is noticing that he gets tired within an hour of taking the metformin.      He had to take time off of work due to the pain, and feeling dizzy and sick at work. 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 The lower abdominal pain is described as throbbing, occasionally with cramping. Sitting down exacerbates the lower abdominal pain and groin pain, and also causes lower back pain. He has never had this happen before. Tylenol was working originally, but no longer works, so he is no longer taking it. Exercise was helping reduce the pain, but he gets the pain again after taking metformin. He is having diarrhea 3-4 times per week, about 30 minutes after taking metformin. No lumps or bumps in the abdomen, no changes in appearance or lumps or bumps of scrotum or penis, but  scrotum does feel heavy.     He has been feeling dizzy and nauseous, since being diagnosed with diabetes. These symptoms are what caused him to come to the doctor, which is when he was found to have diabetes.     He is checking his blood sugar every day in the middle of the day. He has usually eaten something by then. His numbers have been in the mid-200s. He does get dizzy occasionally, but his sugars are usually close to 300.    He not been to work since 8/17/21.  He was hoping to be off for about 1 month and lose about 30 pounds, as he thought this would help with all of the symptoms above and help him be able to work more effectively.  He has lost 10 lbs since last being at work, has been working out more. He feels he is not able to work due to the abdominal/groin/back pain, but more importantly the dizziness.  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    Jardiance was recommended and prescribed on 8/13/2021, but he has not started Jardiance yet due to the pain above.     Review of Systems   10 point review of systems negative other than listed above.      Objective    Vitals:    09/02/21 1523   BP: 113/79   BP Location: Left arm   Patient Position: Sitting   Cuff Size: Adult Large   Pulse: 111   Resp: 16   Temp: 98.5  F (36.9  C)   TempSrc: Oral   SpO2: 96%   Weight: 105.1 kg (231 lb 9.6 oz)     Body mass index is 34.3 kg/m .  Physical Exam   General: alert, appears comfortable, no acute distress  HEENT: atraumatic, conjunctiva clear without erythema, EOM's intact, mask in place  Neck: supple  Cardiac: normal rate and rhythm with no murmurs or extra sounds  Resp: lungs clear to auscultation bilaterally with no crackles or wheezes, no increased work of breathing  Abdomen: soft but mildly distended, normal bowel sounds, diffusely tender to palpation, more so in lower quadrants, no rebound or guarding, no masses  Gyn: Penis and scrotum appear normal without  lesions or edema, minimal tenderness of bilateral testes without edema  Extremities: no peripheral edema  Skin: Left anterior mid thigh with 3 nonerythematous less than 1 cm palpable subcutaneous nodules  Neuro: CN's grossly intact  Psych: affect congruent with mood

## 2021-09-02 NOTE — PATIENT INSTRUCTIONS
Plan:  1. It was a pleasure seeing you in clinic today.  2. Stop taking your metformin. Keep track of your symptoms.  3. Your next dose of Bydureon is next Tuesday. Inject the medication into your abdomen instead of your leg.   4. Come back to see Dr. Jordan on Wednesday, September 8.  5. I will write a letter for work excusing you from work until September 8.     [facility]  Phone: [phone]  Address: [address] [place] [address]

## 2021-09-02 NOTE — PROGRESS NOTES
Preceptor Attestation:    I discussed the patient with the resident and evaluated the patient in person. I have verified the content of the note, which accurately reflects my assessment of the patient and the plan of care.   Supervising Physician:  Jeremiah Jacobsen MD.

## 2021-09-08 ENCOUNTER — APPOINTMENT (OUTPATIENT)
Dept: CT IMAGING | Facility: CLINIC | Age: 36
End: 2021-09-08
Payer: COMMERCIAL

## 2021-09-08 ENCOUNTER — OFFICE VISIT (OUTPATIENT)
Dept: FAMILY MEDICINE | Facility: CLINIC | Age: 36
End: 2021-09-08
Payer: COMMERCIAL

## 2021-09-08 ENCOUNTER — HOSPITAL ENCOUNTER (EMERGENCY)
Facility: CLINIC | Age: 36
Discharge: HOME | End: 2021-09-08
Admitting: EMERGENCY MEDICINE
Payer: COMMERCIAL

## 2021-09-08 VITALS
WEIGHT: 232 LBS | HEART RATE: 91 BPM | RESPIRATION RATE: 20 BRPM | SYSTOLIC BLOOD PRESSURE: 129 MMHG | TEMPERATURE: 98.1 F | OXYGEN SATURATION: 99 % | DIASTOLIC BLOOD PRESSURE: 83 MMHG | HEIGHT: 69 IN | BODY MASS INDEX: 34.36 KG/M2

## 2021-09-08 VITALS
DIASTOLIC BLOOD PRESSURE: 86 MMHG | OXYGEN SATURATION: 96 % | HEART RATE: 104 BPM | BODY MASS INDEX: 34.75 KG/M2 | TEMPERATURE: 98.5 F | WEIGHT: 234.6 LBS | RESPIRATION RATE: 16 BRPM | SYSTOLIC BLOOD PRESSURE: 133 MMHG

## 2021-09-08 DIAGNOSIS — R10.32 ABDOMINAL PAIN, LEFT LOWER QUADRANT: ICD-10-CM

## 2021-09-08 DIAGNOSIS — E11.9 TYPE 2 DIABETES MELLITUS WITHOUT COMPLICATION, WITHOUT LONG-TERM CURRENT USE OF INSULIN (H): Primary | ICD-10-CM

## 2021-09-08 DIAGNOSIS — E11.9 TYPE 2 DIABETES MELLITUS WITHOUT COMPLICATION, WITHOUT LONG-TERM CURRENT USE OF INSULIN (H): ICD-10-CM

## 2021-09-08 LAB
ALBUMIN SERPL-MCNC: 4.2 G/DL (ref 3.5–5)
ALBUMIN UR-MCNC: 20 MG/DL
ALP SERPL-CCNC: 83 U/L (ref 45–120)
ALT SERPL W P-5'-P-CCNC: 49 U/L (ref 0–45)
ANION GAP SERPL CALCULATED.3IONS-SCNC: 13 MMOL/L (ref 5–18)
APPEARANCE UR: CLEAR
AST SERPL W P-5'-P-CCNC: 30 U/L (ref 0–40)
BILIRUB DIRECT SERPL-MCNC: <0.1 MG/DL
BILIRUB SERPL-MCNC: 0.3 MG/DL (ref 0–1)
BILIRUB UR QL STRIP: NEGATIVE
BUN SERPL-MCNC: 12 MG/DL (ref 8–22)
CALCIUM SERPL-MCNC: 9.9 MG/DL (ref 8.5–10.5)
CHLORIDE BLD-SCNC: 103 MMOL/L (ref 98–107)
CO2 SERPL-SCNC: 21 MMOL/L (ref 22–31)
COLOR UR AUTO: ABNORMAL
CREAT SERPL-MCNC: 0.92 MG/DL (ref 0.7–1.3)
ERYTHROCYTE [DISTWIDTH] IN BLOOD BY AUTOMATED COUNT: 12.5 % (ref 10–15)
GFR SERPL CREATININE-BSD FRML MDRD: >90 ML/MIN/1.73M2
GLUCOSE BLD-MCNC: 242 MG/DL (ref 70–125)
GLUCOSE BLDC GLUCOMTR-MCNC: 246 MG/DL (ref 70–125)
GLUCOSE UR STRIP-MCNC: >1000 MG/DL
HCT VFR BLD AUTO: 43.4 % (ref 40–53)
HGB BLD-MCNC: 14.9 G/DL (ref 13.3–17.7)
HGB UR QL STRIP: NEGATIVE
KETONES UR STRIP-MCNC: NEGATIVE MG/DL
LEUKOCYTE ESTERASE UR QL STRIP: NEGATIVE
LIPASE SERPL-CCNC: 26 U/L (ref 0–52)
MCH RBC QN AUTO: 29.8 PG (ref 26.5–33)
MCHC RBC AUTO-ENTMCNC: 34.3 G/DL (ref 31.5–36.5)
MCV RBC AUTO: 87 FL (ref 78–100)
NITRATE UR QL: NEGATIVE
PH UR STRIP: 6 [PH] (ref 5–7)
PLATELET # BLD AUTO: 349 10E3/UL (ref 150–450)
POTASSIUM BLD-SCNC: 4.2 MMOL/L (ref 3.5–5)
PROT SERPL-MCNC: 7.9 G/DL (ref 6–8)
RBC # BLD AUTO: 5 10E6/UL (ref 4.4–5.9)
RBC URINE: <1 /HPF
SODIUM SERPL-SCNC: 137 MMOL/L (ref 136–145)
SP GR UR STRIP: 1.04 (ref 1–1.03)
UROBILINOGEN UR STRIP-MCNC: <2 MG/DL
WBC # BLD AUTO: 7.3 10E3/UL (ref 4–11)
WBC URINE: <1 /HPF

## 2021-09-08 PROCEDURE — 74177 CT ABD & PELVIS W/CONTRAST: CPT

## 2021-09-08 PROCEDURE — 82248 BILIRUBIN DIRECT: CPT | Performed by: PHYSICIAN ASSISTANT

## 2021-09-08 PROCEDURE — 99285 EMERGENCY DEPT VISIT HI MDM: CPT | Mod: 25

## 2021-09-08 PROCEDURE — 85027 COMPLETE CBC AUTOMATED: CPT | Performed by: PHYSICIAN ASSISTANT

## 2021-09-08 PROCEDURE — 99214 OFFICE O/P EST MOD 30 MIN: CPT | Mod: GC | Performed by: STUDENT IN AN ORGANIZED HEALTH CARE EDUCATION/TRAINING PROGRAM

## 2021-09-08 PROCEDURE — 80053 COMPREHEN METABOLIC PANEL: CPT | Performed by: PHYSICIAN ASSISTANT

## 2021-09-08 PROCEDURE — 83690 ASSAY OF LIPASE: CPT | Performed by: PHYSICIAN ASSISTANT

## 2021-09-08 PROCEDURE — 81001 URINALYSIS AUTO W/SCOPE: CPT | Performed by: EMERGENCY MEDICINE

## 2021-09-08 PROCEDURE — 36415 COLL VENOUS BLD VENIPUNCTURE: CPT | Performed by: PHYSICIAN ASSISTANT

## 2021-09-08 PROCEDURE — 82310 ASSAY OF CALCIUM: CPT | Performed by: PHYSICIAN ASSISTANT

## 2021-09-08 PROCEDURE — 250N000011 HC RX IP 250 OP 636: Performed by: PHYSICIAN ASSISTANT

## 2021-09-08 RX ORDER — DICYCLOMINE HCL 20 MG
20 TABLET ORAL 2 TIMES DAILY PRN
Qty: 20 TABLET | Refills: 0 | Status: SHIPPED | OUTPATIENT
Start: 2021-09-08 | End: 2021-09-18

## 2021-09-08 RX ORDER — IOPAMIDOL 755 MG/ML
100 INJECTION, SOLUTION INTRAVASCULAR ONCE
Status: COMPLETED | OUTPATIENT
Start: 2021-09-08 | End: 2021-09-08

## 2021-09-08 RX ADMIN — IOPAMIDOL 100 ML: 755 INJECTION, SOLUTION INTRAVENOUS at 17:43

## 2021-09-08 ASSESSMENT — ENCOUNTER SYMPTOMS
FEVER: 0
CHILLS: 0
HEMATURIA: 0
VOMITING: 0
DYSURIA: 0
FREQUENCY: 1

## 2021-09-08 ASSESSMENT — MIFFLIN-ST. JEOR: SCORE: 1977.73

## 2021-09-08 NOTE — ED PROVIDER NOTES
EMERGENCY DEPARTMENT ENCOUNTER      NAME: Phu Loja  AGE: 36 year old male  YOB: 1985  MRN: [id number]  EVALUATION DATE & TIME: 9/8/2021  4:52 PM    PCP: Manuel Jordan    ED PROVIDER: Evangelina Moore PA-C      Chief Complaint   Patient presents with     Flank Pain         FINAL IMPRESSION:  1. Abdominal pain, left lower quadrant    2. Type 2 diabetes mellitus without complication, without long-term current use of insulin (H)          MEDICAL DECISION MAKING:    Pertinent Labs & Imaging studies reviewed. (See chart for details)  36 year old male recently diagnosed with T2DM presents to the Emergency Department for evaluation of left flank pain that radiates to his LLQ for 1 month. Associated urinary frequency and increased thirst. Reports diarrhea from metformin. Denies dysuria, hematuria, fevers, vomiting. No history of abdominal surgeries.     Vitals reviewed and notable for elevated blood pressure and borderline tachycardia on arrival that normalized throughout time in ED. On exam, abdomen is soft with mild LLQ tenderness. No distension, rebound or guarding. No back or CVA tenderness. Differential diagnosis includes but not limited to mesenteric ischemia, colitis, diverticulitis, appendicitis, pyelonephritis, ureterolithiasis, bowel obstruction, hernia, IBS, intestinal colic/gas pains.     UA without evidence of infection. Glucose in urine and blood glucose is 242. Nothing to suggest DKA and he is working closely with his PCP to get his blood sugars under control. No leukocytosis. Hgb 14.9. Normal renal function. Just the slightest elevation in ALT with remainder of his LFTs WNL. Lipase WNL. CT abd pelvis is unremarkable. No bowel obstruction, acute inflammatory process involves the GI tract, hydronephrosis or kidney stones. A very small fat-containing paraumbilical hernia. Unclear etiology for his pain however do feel we have rule out any emergent/surgical process. He does feel comfortable  discharging home. Encouraged him to follow closely with PCP and we discussed return precautions. Patient discharged home in stable condition.     0 minutes of critical care time     ED COURSE:  4:57 PM I met with the patient, obtained history, performed an initial exam, and discussed options and plan for diagnostics and treatment here in the ED. PPE worn throughout all patient encounters; surgical mask, gloves.  7:26 PM We discussed the plan for discharge and the patient is agreeable. Reviewed supportive cares, symptomatic treatment, outpatient follow up, and reasons to return to the Emergency Department. Patient to be discharged by ED RN.     At the conclusion of the encounter I discussed the results of all of the tests and the disposition. The questions were answered. The patient acknowledged understanding and was agreeable with the care plan.     MEDICATIONS GIVEN IN THE EMERGENCY:  Medications   iopamidol (ISOVUE-370) solution 100 mL (100 mLs Intravenous Given 9/8/21 1743)       NEW PRESCRIPTIONS STARTED AT TODAY'S ER VISIT  Discharge Medication List as of 9/8/2021  7:24 PM          =================================================================    HPI:    Patient information was obtained from: patient     Use of Interpretor: N/A    Phu FRASER Purvi is a 36 year old male with a pertinent history of type 2 diabetes who presents to this ED for evaluation of abdominal pain.     Patient reports that he has recently been diagnosed with diabetes. He states that about 1 month ago he began having left flank pain that radiates to his LLQ and groin region. Initially, abdominal pain thought to be secondary to metformin so this was temporarily held however pain persisted and he is now back on the metformin. He reports diarrhea since restarting the metformin. His primary recommended he come into the ED due to the ongoing symptoms. He also reports increased urinary frequency, but denies fever, chills, vomiting, hematuria,  dysuria, or any additional symptoms at this time.     REVIEW OF SYSTEMS:  Review of Systems   Constitutional: Negative for chills and fever.   Respiratory: Negative for shortness of breath.    Cardiovascular: Negative for chest pain.   Gastrointestinal: Positive for abdominal pain (LLQ) and diarrhea. Negative for blood in stool and vomiting.   Genitourinary: Positive for flank pain (left flank) and frequency. Negative for dysuria, hematuria, scrotal swelling and testicular pain.   All other systems reviewed and are negative.    PAST MEDICAL HISTORY:  Past Medical History:   Diagnosis Date     Type 2 diabetes mellitus without complication, without long-term current use of insulin (H) 9/2/2021       PAST SURGICAL HISTORY:  No past surgical history on file.      CURRENT MEDICATIONS:    No current facility-administered medications for this encounter.    Current Outpatient Medications:      dicyclomine (BENTYL) 20 MG tablet, Take 1 tablet (20 mg) by mouth 2 times daily as needed (abdominal pain), Disp: 20 tablet, Rfl: 0     blood glucose (NO BRAND SPECIFIED) test strip, Use to test blood sugar 1 times daily or as directed in the morning., Disp: 100 strip, Rfl: 0     blood glucose monitoring (NO BRAND SPECIFIED) meter device kit, Use to test blood sugar 1 times daily or as directed., Disp: 1 kit, Rfl: 1     empagliflozin (JARDIANCE) 10 MG TABS tablet, Take 1 tablet (10 mg) by mouth daily, Disp: 60 tablet, Rfl: 3     exenatide ER (BYDUREON BCISE) 2 MG/0.85ML auto-injector, Inject 2 mg Subcutaneous every 7 days, Disp: 0.85 mL, Rfl: 0     lisinopril (ZESTRIL) 2.5 MG tablet, Take 1 tablet (2.5 mg) by mouth daily (Patient not taking: Reported on 9/8/2021), Disp: 60 tablet, Rfl: 3      ALLERGIES:  No Known Allergies    FAMILY HISTORY:  Family History   Problem Relation Age of Onset     Diabetes No family hx of      Coronary Artery Disease No family hx of      Hypertension No family hx of      Other Cancer No family hx of       "Asthma No family hx of        SOCIAL HISTORY:   Social History     Socioeconomic History     Marital status: Single     Spouse name: Not on file     Number of children: Not on file     Years of education: Not on file     Highest education level: Not on file   Occupational History     Not on file   Tobacco Use     Smoking status: Former Smoker     Smokeless tobacco: Never Used   Vaping Use     Vaping Use: Never used   Substance and Sexual Activity     Alcohol use: Yes     Drug use: No     Sexual activity: Yes     Partners: Female   Other Topics Concern     Not on file   Social History Narrative     Not on file     Social Determinants of Health     Financial Resource Strain:      Difficulty of Paying Living Expenses:    Food Insecurity:      Worried About Running Out of Food in the Last Year:      Ran Out of Food in the Last Year:    Transportation Needs:      Lack of Transportation (Medical):      Lack of Transportation (Non-Medical):    Physical Activity:      Days of Exercise per Week:      Minutes of Exercise per Session:    Stress:      Feeling of Stress :    Social Connections:      Frequency of Communication with Friends and Family:      Frequency of Social Gatherings with Friends and Family:      Attends [employer] Services:      Active Member of Clubs or Organizations:      Attends Club or Organization Meetings:      Marital Status:    Intimate Partner Violence:      Fear of Current or Ex-Partner:      Emotionally Abused:      Physically Abused:      Sexually Abused:        VITALS:  Patient Vitals for the past 24 hrs:   BP Temp Temp src Pulse Resp SpO2 Height Weight   09/08/21 1930 129/83 -- -- 91 20 99 % -- --   09/08/21 1830 133/77 -- -- 87 18 98 % -- --   09/08/21 1610 (!) 119/92 98.1  F (36.7  C) Oral 102 16 98 % 1.753 m (5' 9\") 105.2 kg (232 lb)       PHYSICAL EXAM    Constitutional: Well developed, obese, NAD  HENT: Normocephalic, Atraumatic, Bilateral external ears normal, Oropharynx normal, mucous " membranes moist, Nose normal.   Neck:  Normal range of motion, No tenderness, Supple, No stridor.   Eyes: PERRL, Conjunctiva normal, No discharge. No scleral icterus.  Respiratory: Normal breath sounds, No respiratory distress, No wheezing, Speaks full sentences easily. No cough.    Cardiovascular: Normal heart rate, Regular rhythm, No murmurs, No rubs, No gallops. Chest wall nontender.    GI: LLQ tenderness. Soft, No masses, No flank or CVA tenderness. No rebound or guarding.    Musculoskeletal: 2+ DP pulses. No edema. No cyanosis, No clubbing. Good range of motion in all major joints. No tenderness to palpation or major deformities noted. No tenderness of the CTLS spine.   Integument: Warm, Dry, No erythema, No rash. No petechiae. No jaundice.  Neurologic: Alert & oriented x 3, Normal motor function, Normal sensory function, No focal deficits noted. Normal gait.    Psychiatric: Affect normal, Judgment normal, Mood normal. Cooperative.     LAB:  All pertinent labs reviewed and interpreted.  Recent Results (from the past 24 hour(s))   UA with Microscopic reflex to Culture    Collection Time: 09/08/21  4:16 PM    Specimen: Urine, Clean Catch   Result Value Ref Range    Color Urine Light Yellow Colorless, Straw, Light Yellow, Yellow    Appearance Urine Clear Clear    Glucose Urine >1000 (A) Negative mg/dL    Bilirubin Urine Negative Negative    Ketones Urine Negative Negative mg/dL    Specific Gravity Urine 1.036 (H) 1.001 - 1.030    Blood Urine Negative Negative    pH Urine 6.0 5.0 - 7.0    Protein Albumin Urine 20  (A) Negative mg/dL    Urobilinogen Urine <2.0 <2.0 mg/dL    Nitrite Urine Negative Negative    Leukocyte Esterase Urine Negative Negative    RBC Urine <1 <=2 /HPF    WBC Urine <1 <=5 /HPF   Glucose by meter    Collection Time: 09/08/21  4:58 PM   Result Value Ref Range    GLUCOSE BY METER POCT 246 (H) 70 - 125 mg/dL   CBC (+ platelets, no diff)    Collection Time: 09/08/21  5:07 PM   Result Value Ref  Range    WBC Count 7.3 4.0 - 11.0 10e3/uL    RBC Count 5.00 4.40 - 5.90 10e6/uL    Hemoglobin 14.9 13.3 - 17.7 g/dL    Hematocrit 43.4 40.0 - 53.0 %    MCV 87 78 - 100 fL    MCH 29.8 26.5 - 33.0 pg    MCHC 34.3 31.5 - 36.5 g/dL    RDW 12.5 10.0 - 15.0 %    Platelet Count 349 150 - 450 10e3/uL   Basic metabolic panel    Collection Time: 09/08/21  5:07 PM   Result Value Ref Range    Sodium 137 136 - 145 mmol/L    Potassium 4.2 3.5 - 5.0 mmol/L    Chloride 103 98 - 107 mmol/L    Carbon Dioxide (CO2) 21 (L) 22 - 31 mmol/L    Anion Gap 13 5 - 18 mmol/L    Urea Nitrogen 12 8 - 22 mg/dL    Creatinine 0.92 0.70 - 1.30 mg/dL    Calcium 9.9 8.5 - 10.5 mg/dL    Glucose 242 (H) 70 - 125 mg/dL    GFR Estimate >90 >60 mL/min/1.73m2   Lipase    Collection Time: 09/08/21  5:07 PM   Result Value Ref Range    Lipase 26 0 - 52 U/L   Hepatic function panel    Collection Time: 09/08/21  5:07 PM   Result Value Ref Range    Bilirubin Total 0.3 0.0 - 1.0 mg/dL    Bilirubin Direct <0.1 <=0.5 mg/dL    Protein Total 7.9 6.0 - 8.0 g/dL    Albumin 4.2 3.5 - 5.0 g/dL    Alkaline Phosphatase 83 45 - 120 U/L    AST 30 0 - 40 U/L    ALT 49 (H) 0 - 45 U/L         RADIOLOGY:  Reviewed all pertinent imaging. Please see official radiology report.  CT Abdomen Pelvis w Contrast   Final Result   IMPRESSION:    1.  Mild hepatic steatosis.   2.  Very small fat-containing paraumbilical hernia.        I, Lexus Cavanaugh, am serving as a scribe to document services personally performed by Evangelina Moore PA-C based on my observation and the provider's statements to me. I, Evangelina Moore PA-C attest that Lexus Cavanaugh is acting in a scribe capacity, has observed my performance of the services and has documented them in accordance with my direction.    Evangelina Moore PA-C  Emergency Medicine  [facility]  9/8/2021     Evangelina Moore PA-C  09/10/21 0739

## 2021-09-08 NOTE — ED NOTES
Pt Hx diabetes, stopped taking metformin he states about 1 month ago but restarted today, states BG levels have been high at home. Pt c/o lower abdominal pain and urinary frequency, increased thirst, denies other urinary Sx, denies hx of kidney stones. Denies n/v.  Provider at bedside.

## 2021-09-08 NOTE — ED NOTES
Expected Patient Referral to ED  2:28 PM    Referring Clinic/Provider:  Primary Care Clinic    Reason for referral/Clinical facts:  37 y/o M  Recent diagnosis T2 diabetes  Left flank pain for 2 weeks now worsening, increased urinary frequency  Concern for possible stone  , /86, RR 16, sating well on room air, afebrile    Recommendations provided:  Emergency Room evaluation    Kylie Ferrell MD  Emergency Medicine  [facility] EMERGENCY ROOM  [address] [place] [id number] [phone]       Kylie Ferrell MD  09/08/21 5963

## 2021-09-08 NOTE — ED TRIAGE NOTES
Arrives to ED from clinic with c/o L sided flank pain that radiates into L lower abd and L groin. Began x1 month ago. Sent to r/o kidney stone. Afebrile. Denies N/V. Reports urinary frequency.

## 2021-09-08 NOTE — PROGRESS NOTES
There are no exam notes on file for this visit.  Chief Complaint   Patient presents with     RECHECK     follow up on DM/ take off Metformet to see if the pain is go away, but it does not per pt      other     FMLA form needs to fill out per pt      Blood pressure 133/86, pulse 104, temperature 98.5  F (36.9  C), temperature source Oral, resp. rate 16, weight 106.4 kg (234 lb 9.6 oz), SpO2 96 %.           JANET Bay is a 36 year old  male with a PMH significant for:     Patient Active Problem List   Diagnosis     Type 2 diabetes mellitus without complication, without long-term current use of insulin (H)     He presents with left flank pain and urinary frequency. Pain started 2 weeks ago after starting medication with myself. He describes the pain as a off and on sharp pain that radiates into his groin and testis. Rates the pain an 9/10. He endorses abdominal distention, nausea but no emesis as well as decreased PO intake. The pain has gotten progressively worse and he's been taking some tylenol but not much and is otherwise trying to deal with it on his own.    Denies fever, chills, diarrhea or constipation.           OBJECTIVE     Vitals:    09/08/21 1358   BP: 133/86   BP Location: Left arm   Cuff Size: Adult Small   Pulse: 104   Resp: 16   Temp: 98.5  F (36.9  C)   TempSrc: Oral   SpO2: 96%   Weight: 106.4 kg (234 lb 9.6 oz)     Body mass index is 34.75 kg/m .    Constitutional: Awake, alert, cooperative, no acute distress, and appears stated age.  Back: Left sided CVA tenderness.   Lungs: No increased WOB. CTABL, no crackles or wheezing appreciated.  Cardiovascular: Appears well perfused. RRR, normal S1 and S2, no S3 or S4, and no murmur appreciated.  Abdomen: Distended abdomen with tenderness over the lower abdomen LLQ > RLQ, no masses palpated  Neurologic: A&Ox3.   Sensory is intact and gait is normal.  Neuropsychiatric: Normal affect, mood, orientation, memory and insight.  Skin: No visible  rashes, erythema, pallor, petechia or purpura.    No results found for any visits on 09/08/21.    ASSESSMENT AND PLAN     Phu was seen today for recheck and other.    Diagnoses and all orders for this visit:    Type 2 diabetes mellitus without complication, without long-term current use of insulin (H)  -     Glucose by meter POCT    Given patient's acute distress and worsening symptoms over the last few weeks, patient was agreeable to presenting to [facility] emergency department for emergent workup. 1 month ago labs were concerning for a Cr of 1.35 and a glucose of 470, a POC glucose was unable to be obtained at the clinic. Patient was signed out to ED provider at [facility].    Follow up within 1 week for ED follow up.    Manuel Jordan MD  9/8/2021  Precepted with Dr. Heredia

## 2021-09-08 NOTE — PROGRESS NOTES
Preceptor Attestation:    I discussed the patient with the resident and evaluated the patient in person. I have verified the content of the note, which accurately reflects my assessment of the patient and the plan of care.   Supervising Physician:  Jorden Heredia MD.

## 2021-09-09 ENCOUNTER — TELEPHONE (OUTPATIENT)
Dept: FAMILY MEDICINE | Facility: CLINIC | Age: 36
End: 2021-09-09

## 2021-09-09 NOTE — TELEPHONE ENCOUNTER
There has been discussion in previous office notes regarding FMLA paperwork but this has not yet been completed. Routed to Dr. Benito, Dr. Jordan. ./RUBIO

## 2021-09-09 NOTE — TELEPHONE ENCOUNTER
Called patient back, no answer. Left message stating Dr. Jordan would not be in clinic for a few weeks so I was unsure when he would be able to call back.     Routed to Dr. Jordan. ./RUBIO

## 2021-09-09 NOTE — TELEPHONE ENCOUNTER
[facility] phone call message- general phone call:    Reason for call: Pt emailed in FMLA paperwork to medical records. This needs to be filled out ASAP. Please call when completed. Dr. Jordan is patients PCP and he is gone for a couple of weeks.     Return call needed: Yes    OK to leave a message on voice mail? Yes    Primary language: English      needed? No    Call taken on September 9, 2021 at 11:53 AM by Isai Millard

## 2021-09-09 NOTE — DISCHARGE INSTRUCTIONS
Unclear etiology for your abdominal pain.  Your work-up today is reassuring.  There is no evidence of a kidney stone.  Your urine is not infected.  Your labs look good.  Continue to work with your primary care provider on controlling your blood sugars. You can try bentyl to see if this helps. Prescription sent to your pharmacy.  Return with new or worsening symptoms.

## 2021-09-09 NOTE — TELEPHONE ENCOUNTER
[facility] phone call message- general phone call:    Reason for call: the pt would like the Dr to call him would not say why    Return call needed: Yes    OK to leave a message on voice mail? Yes    Primary language: English      needed? No    Call taken on September 9, 2021 at 9:07 AM by Chente Hoyt

## 2021-09-10 ASSESSMENT — ENCOUNTER SYMPTOMS
BLOOD IN STOOL: 0
SHORTNESS OF BREATH: 0
FLANK PAIN: 1
ABDOMINAL PAIN: 1
DIARRHEA: 1

## 2021-09-10 NOTE — TELEPHONE ENCOUNTER
[facility] phone call message- general phone call:    Reason for call: Pt is calling back to check on status of the FMLA paperwork. He sent additional forms via e-mail.     Return call needed: Yes    OK to leave a message on voice mail? Yes    Primary language: English      needed? No    Call taken on September 10, 2021 at 2:13 PM by Isai Millard

## 2021-09-10 NOTE — TELEPHONE ENCOUNTER
Per Dr. Jordan, he will be on 9/14 and will be able to do the paperwork at that time. Let Phu know. ./LR

## 2021-09-14 ENCOUNTER — TELEPHONE (OUTPATIENT)
Dept: FAMILY MEDICINE | Facility: CLINIC | Age: 36
End: 2021-09-14

## 2021-09-14 NOTE — TELEPHONE ENCOUNTER
[facility] phone call message- general phone call:    Reason for call: Calling and checking on the status of his FMLA paperwork.    Return call needed: Yes    OK to leave a message on voice mail? Yes    Primary language: English      needed? No    Call taken on September 14, 2021 at 10:11 AM by Isai Millard

## 2021-09-14 NOTE — TELEPHONE ENCOUNTER
As previously discussed with patient, Dr. Jordan is in today and will fill out the forms at that time. Routed to Dr. Jordan, Ma, PCS. ./LR

## 2021-09-18 ENCOUNTER — TRANSFERRED RECORDS (OUTPATIENT)
Dept: HEALTH INFORMATION MANAGEMENT | Facility: CLINIC | Age: 36
End: 2021-09-18

## 2021-09-18 LAB
ALT SERPL-CCNC: 57 IU/L (ref 8–45)
AST SERPL-CCNC: 33 IU/L (ref 2–40)
CREATININE (EXTERNAL): 1.06 MG/DL (ref 0.72–1.25)
GFR ESTIMATED (EXTERNAL): >60 ML/MIN/1.73M2
GFR ESTIMATED (IF AFRICAN AMERICAN) (EXTERNAL): >60 ML/MIN/1.73M2
GLUCOSE (EXTERNAL): 138 MG/DL (ref 65–100)
POTASSIUM (EXTERNAL): 4 MMOL/L (ref 3.5–5)

## 2021-10-11 ENCOUNTER — TELEPHONE (OUTPATIENT)
Dept: FAMILY MEDICINE | Facility: CLINIC | Age: 36
End: 2021-10-11

## 2021-10-11 NOTE — TELEPHONE ENCOUNTER
He also left a VM on Med Rec. We have paperwork for FMLA scanned into chart 9/27. I left him a VM to call us back to see if this is new paperwork or not.  Darcy          [facility] phone call message- general phone call:    Reason for call: the Pt called to ask for the Dr to call him he has been waiting  for forms to be completed and has not received them     Return call needed: Yes    OK to leave a message on voice mail? Yes    Primary language: English      needed? No    Call taken on October 11, 2021 at 2:44 PM by Chente Hoyt

## 2021-10-12 ENCOUNTER — TELEPHONE (OUTPATIENT)
Dept: FAMILY MEDICINE | Facility: CLINIC | Age: 36
End: 2021-10-12

## 2021-10-12 NOTE — TELEPHONE ENCOUNTER
[facility] phone call message- general phone call:    Reason for call: Pt stopped in about FMLA paperwork, Erickson faxed it again, but he would still like Dr Jordan to call him would like to talk about back problems, also made appt with you 10/13/2021.    Action desired: return call     Return call needed: Yes    OK to leave a message on voice mail? Yes    Advised patient to response may take up to 2 business days: No    Primary language: English      needed? No    Call taken on October 12, 2021 at 3:47 PM by Hyun Cavanaugh

## 2021-10-15 NOTE — TELEPHONE ENCOUNTER
Patient has an appointment today with Dr. Jordan, but somehow patient reschedule to 10/20/21.  I have the form in my desk and Dr. Jordan know about it.

## 2021-10-25 ENCOUNTER — TELEPHONE (OUTPATIENT)
Dept: FAMILY MEDICINE | Facility: CLINIC | Age: 36
End: 2021-10-25

## 2021-10-25 NOTE — TELEPHONE ENCOUNTER
[facility] phone call message- general phone call:    Reason for call:     Pt is applying for short term disability.     They have a peer to peer claim for pt's case. Wondering if  would to support the claim.     Action desired:     Call back.     Return call needed: Yes    OK to leave a message on voice mail? Yes    Advised patient to response may take up to 2 business days: Yes    Primary language: English      needed? No    Call taken on October 25, 2021 at 4:07 PM by Smiley Dickerson

## 2021-10-27 ENCOUNTER — ANCILLARY PROCEDURE (OUTPATIENT)
Dept: GENERAL RADIOLOGY | Facility: CLINIC | Age: 36
End: 2021-10-27
Attending: STUDENT IN AN ORGANIZED HEALTH CARE EDUCATION/TRAINING PROGRAM
Payer: COMMERCIAL

## 2021-10-27 ENCOUNTER — OFFICE VISIT (OUTPATIENT)
Dept: FAMILY MEDICINE | Facility: CLINIC | Age: 36
End: 2021-10-27
Payer: COMMERCIAL

## 2021-10-27 VITALS
BODY MASS INDEX: 35 KG/M2 | TEMPERATURE: 98.2 F | HEART RATE: 77 BPM | DIASTOLIC BLOOD PRESSURE: 79 MMHG | SYSTOLIC BLOOD PRESSURE: 118 MMHG | OXYGEN SATURATION: 97 % | RESPIRATION RATE: 16 BRPM | WEIGHT: 237 LBS

## 2021-10-27 DIAGNOSIS — M54.50 CHRONIC RIGHT-SIDED LOW BACK PAIN WITHOUT SCIATICA: ICD-10-CM

## 2021-10-27 DIAGNOSIS — M54.16 LUMBAR RADICULOPATHY: Primary | ICD-10-CM

## 2021-10-27 DIAGNOSIS — M54.16 LUMBAR RADICULOPATHY: ICD-10-CM

## 2021-10-27 DIAGNOSIS — G89.29 CHRONIC RIGHT-SIDED LOW BACK PAIN WITHOUT SCIATICA: ICD-10-CM

## 2021-10-27 PROCEDURE — 72100 X-RAY EXAM L-S SPINE 2/3 VWS: CPT | Mod: FY | Performed by: RADIOLOGY

## 2021-10-27 PROCEDURE — 99213 OFFICE O/P EST LOW 20 MIN: CPT | Mod: GC | Performed by: STUDENT IN AN ORGANIZED HEALTH CARE EDUCATION/TRAINING PROGRAM

## 2021-10-27 ASSESSMENT — PAIN SCALES - GENERAL: PAINLEVEL: SEVERE PAIN (7)

## 2021-10-27 NOTE — PROGRESS NOTES
Preceptor Attestation:    I discussed the patient with the resident and evaluated the patient in person. I personally reviewed the imaging and agree with the interpretation documented by the resident. I have verified the content of the note, which accurately reflects my assessment of the patient and the plan of care.   Supervising Physician:  Jorden Heredia MD.

## 2021-10-27 NOTE — PROGRESS NOTES
Assessment & Plan     Chronic Right Lower Back Pain; no Sciatica  XR lumbar spine is unremarkable, and symptoms are not really consistent with Sciatica. Patient's symptoms are more consistent with lumbar strain.  Discussed a referral to physical therapy for evaluation and therapy for lumbar back pain.   Patient is asking if his condition qualifies for disability coverage, told patient strongly that this is would not qualify based off my current evaluation but he is insistent that it does.  Discussed that our goals should be oriented towards returning back to work and improving patients functionality best we can, but patient left appointment at this point stating he needed to  child.  - Physical Therapy Referral; Future  - XR Lumbar Spine 2/3 Views; Future     Work on weight loss  Regular exercise    Return in about 3 weeks (around 11/17/2021).    Manuel Jordan MD  Precept with Dr. Yan [facility]    Carmen Bay is a 36 year old who presents for the following health issues.    ANDREA Bay is coming in with lower back pain that radiates into his buttock and groin, he was evaluated in the Emergency Department twice at [facility] and [place].  His evaluation at [facility] was totally unremarkable including a CT abdomen that showed no intra-abdominal pathology. A second ED visit to [place] ED where he was not imaged further but was given a course of steroids and told he may have radiculopathy. He denies any bowel, bladder incontinence, and lower extremity pain or weakness. His pain and discomfort radiates into his buttock and groin although previously he stated he pain was in his flank and abdomen.  He states that he would like disability forms filled out for his current condition, his [facility] paperwork was accepted by his current employer but has been out of work since late August. 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    Review of Systems   Constitutional, HEENT, cardiovascular, pulmonary, gi and gu systems are negative, except as otherwise noted.      Objective    /79 (BP Location: Left arm, Patient Position: Sitting, Cuff Size: Adult Regular)   Pulse 77   Temp 98.2  F (36.8  C) (Oral)   Resp 16   Wt 107.5 kg (237 lb)   SpO2 97%   BMI 35.00 kg/m    Body mass index is 35 kg/m .  Physical Exam   GENERAL: healthy, alert and no distress  EYES: Eyes grossly normal to inspection, PERRL and conjunctivae and sclerae normal  HENT: ear canals and TM's normal, nose and mouth without ulcers or lesions  NECK: no adenopathy, no asymmetry, masses, or scars and thyroid normal to palpation  RESP: lungs clear to auscultation - no rales, rhonchi or wheezes  CV: regular rate and rhythm, normal S1 S2, no S3 or S4, no murmur, click or rub, no peripheral edema and peripheral pulses strong  ABDOMEN: soft, nontender, no hepatosplenomegaly, no masses and bowel sounds normal  MS: no gross musculoskeletal defects noted, no edema  SKIN: no suspicious lesions or rashes  NEURO: Normal strength and tone in bilateral upper and lower extremities, mentation intact and speech normal  PSYCH: mentation appears normal, affect normal/bright    XR Lumbar Spine - Reviewed and interpreted by radiology: 5 lumbar type vertebral bodies. Normal vertebral body heights. Straightened lumbar lordosis. Lumbar disc heights preserved. Minimal facet degeneration in the lower lumbar facets. Visualized sacrum and bony pelvis grossly intact.        "

## 2021-10-28 ENCOUNTER — TELEPHONE (OUTPATIENT)
Dept: FAMILY MEDICINE | Facility: CLINIC | Age: 36
End: 2021-10-28

## 2021-10-28 NOTE — PATIENT INSTRUCTIONS
10/28/21   PHYSICAL THERAPY REFERRAL    [facility]  [facility]  [address], [address]  [place] [address]   [phone]  fax [phone]    Demographics and referral faxed to [phone], they will contact patient to schedule.     Evangelina Potter

## 2021-10-28 NOTE — TELEPHONE ENCOUNTER
[facility] phone call message- general phone call:    Reason for call: the Pt is applying for short term disability and the Dr for the claim would like to talk to the pcp  About case and would like a call back     Return call needed: Yes    OK to leave a message on voice mail? Yes    Primary language: English      needed? No    Call taken on October 28, 2021 at 1:23 PM by Chente Hoyt

## 2021-11-23 ENCOUNTER — OFFICE VISIT (OUTPATIENT)
Dept: FAMILY MEDICINE | Facility: CLINIC | Age: 36
End: 2021-11-23
Payer: COMMERCIAL

## 2021-11-23 VITALS
SYSTOLIC BLOOD PRESSURE: 129 MMHG | TEMPERATURE: 98.1 F | WEIGHT: 237 LBS | DIASTOLIC BLOOD PRESSURE: 84 MMHG | OXYGEN SATURATION: 97 % | RESPIRATION RATE: 16 BRPM | HEART RATE: 100 BPM | BODY MASS INDEX: 35 KG/M2

## 2021-11-23 DIAGNOSIS — R10.31 GROIN PAIN, RIGHT: ICD-10-CM

## 2021-11-23 DIAGNOSIS — M54.16 LUMBAR RADICULOPATHY: Primary | ICD-10-CM

## 2021-11-23 PROCEDURE — 99213 OFFICE O/P EST LOW 20 MIN: CPT | Mod: GC | Performed by: STUDENT IN AN ORGANIZED HEALTH CARE EDUCATION/TRAINING PROGRAM

## 2021-11-23 NOTE — LETTER
RETURN TO WORK/SCHOOL FORM    11/23/2021    Re: Phu Loja  1985      To Whom It May Concern:     Phu Loja was seen in clinic today. Please excuse him from work from 7/17/21 until 11/24/21. He may return to work without restrictions on 11/24/21              Karen Lewis MD  11/23/2021 10:26 AM

## 2021-11-23 NOTE — PROGRESS NOTES
Preceptor Attestation:    I discussed the patient with the resident and evaluated the patient in person. I have verified the content of the note, which accurately reflects my assessment of the patient and the plan of care.   Supervising Physician:  Jose Head MD.

## 2021-11-23 NOTE — PROGRESS NOTES
Assessment & Plan     1. Lumbar radiculopathy  2. Groin pain, right  Back and groin pain that previously restricted him from his work duties are much improved.  Back exam normal today.  No weakness, sensory changes, or other red flag symptoms.  Plan to clear for work without restrictions.  He will keep an eye on his symptoms and come back if they worsen after restarting work.      Return if symptoms worsen or fail to improve.    Karen Lewis MD PGY3  [facility]    Carmen Bay is a 36 year old who presents for the following health issues:    HPI   He reports today for a work note.  He states that he has been out of work since August 17 related to groin pain.  He has had a CT that did not show any abnormalities.  Also a lumbar spine x-ray that revealed minimal facet degeneration.  He was treated with a muscle relaxer and physical therapy and reports that both his back pain and his groin pain have improved.  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  He states that there is some heavy lifting and sliding for that he does not think it will be an issue for him and does not need a work restriction or modification.    He also has some questions about the Covid vaccine.  He is wondering which is more effective, as he is considering getting one soon.    Review of Systems   Constitutional, HEENT, cardiovascular, pulmonary, gi and gu systems are negative, except as otherwise noted.      Objective    /84 (BP Location: Left arm, Patient Position: Sitting, Cuff Size: Adult Large)   Pulse 100   Temp 98.1  F (36.7  C) (Oral)   Resp 16   Wt 107.5 kg (237 lb)   SpO2 97%   BMI 35.00 kg/m    Body mass index is 35 kg/m .  Physical Exam   GENERAL: healthy, alert and no distress  NECK: no adenopathy, no asymmetry, masses, or scars and thyroid normal to palpation  RESP: breathing comfortably on room air  CV: appears well-perfused  MS: no gross musculoskeletal defects noted, no  edema  Comprehensive back pain exam:  Range of motion not limited by pain, Lower extremity strength functional and equal on both sides, Lower extremity reflexes within normal limits bilaterally, Lower extremity sensation normal and equal on both sides and Straight leg raise negative bilaterally. Gena and FADIR test negative.

## 2021-12-30 ENCOUNTER — OFFICE VISIT (OUTPATIENT)
Dept: FAMILY MEDICINE | Facility: CLINIC | Age: 36
End: 2021-12-30
Payer: COMMERCIAL

## 2021-12-30 VITALS
HEART RATE: 95 BPM | TEMPERATURE: 99.2 F | WEIGHT: 234.6 LBS | DIASTOLIC BLOOD PRESSURE: 79 MMHG | SYSTOLIC BLOOD PRESSURE: 121 MMHG | OXYGEN SATURATION: 99 % | BODY MASS INDEX: 34.64 KG/M2

## 2021-12-30 DIAGNOSIS — U07.1 COVID-19: Primary | ICD-10-CM

## 2021-12-30 PROCEDURE — 99213 OFFICE O/P EST LOW 20 MIN: CPT | Mod: CS | Performed by: STUDENT IN AN ORGANIZED HEALTH CARE EDUCATION/TRAINING PROGRAM

## 2021-12-30 PROCEDURE — U0005 INFEC AGEN DETEC AMPLI PROBE: HCPCS | Performed by: STUDENT IN AN ORGANIZED HEALTH CARE EDUCATION/TRAINING PROGRAM

## 2021-12-30 PROCEDURE — U0003 INFECTIOUS AGENT DETECTION BY NUCLEIC ACID (DNA OR RNA); SEVERE ACUTE RESPIRATORY SYNDROME CORONAVIRUS 2 (SARS-COV-2) (CORONAVIRUS DISEASE [COVID-19]), AMPLIFIED PROBE TECHNIQUE, MAKING USE OF HIGH THROUGHPUT TECHNOLOGIES AS DESCRIBED BY CMS-2020-01-R: HCPCS | Performed by: STUDENT IN AN ORGANIZED HEALTH CARE EDUCATION/TRAINING PROGRAM

## 2021-12-30 NOTE — PROGRESS NOTES
Preceptor Attestation:   Patient seen, evaluated and discussed with the resident. I have verified the content of the note, which accurately reflects my assessment of the patient and the plan of care.   Supervising Physician:  John Amezquita MD

## 2021-12-30 NOTE — PROGRESS NOTES
"  Assessment and Plan    Phu was seen today for dry cough, fever, loss of taste/smell, body ache for 3 days.  Unknown history of exposure to Covid.  Did not receive Covid vaccine.  Normal physical exam.  Most likely has upper respiratory infection.  Patient was provided with pulse oximeter and recommended to check his regularly and if it is \"90% he needs to report to the nearest ED    Diagnoses and all orders for this visit:    COVID-19 ruled out  -     Symptomatic; Unknown COVID-19 Virus (Coronavirus) by PCR Nose             Options for treatment and follow-up care were reviewed with the patient. Patient engaged in the decision making process and verbalized understanding of the options discussed and agreed with the final plan.    Patient was staffed with attending physician MD Saturnino Camacho MD PGY2           HPI       Phu Loja is a 36 year old year old male w/ PMH of   Patient Active Problem List   Diagnosis     Type 2 diabetes mellitus without complication, without long-term current use of insulin (H)    who presents with complaint of fever, cough, most days, and diarrhea for 3 days.  The cough is dry associated with sore throat that started after having the cough.  Unknown exposure but still going to walk and shopping.  Also reported a fever of 103 that is associated with chills and sweating.  Also complained of shortness of breath with exertion and start   losing his sense of smell.  Also reported generalized body ache and joint pain.  Patient is treated for Covid, scheduled for January    Problem, Medication and Allergy Lists were   reviewed and updated if needed.     Patient Active Problem List    Diagnosis Date Noted     Type 2 diabetes mellitus without complication, without long-term current use of insulin (H) 09/02/2021     Priority: Medium       Patient is an established patient of this clinic.         Review of Systems:   7 point ROS negative other than as specified " above.         Physical Exam:   /79   Pulse 95   Temp 99.2  F (37.3  C)   Wt 106.4 kg (234 lb 9.6 oz)   SpO2 99%   BMI 34.64 kg/m      Vitals were reviewed and were normal  Vital signs normal except HR     Exam:  Constitutional: healthy, alert, no distress, and cooperative  Head: Normocephalic. No masses, lesions, tenderness or abnormalities  Neck: Neck supple. No adenopathy.  ENT: throat normal without erythema or exudate  Cardiovascular: RRR w/o audible murmur  Respiratory: bilateral clear lungs w/o wheezing, crackles or rhonchi; breathing comfortably on RA  Gastrointestinal: Abdomen soft, non-tender. BS normal.  Musculoskeletal: extremities normal- no gross deformities noted, gait normal, and normal muscle tone  Skin: no suspicious lesions or rashes  Neurologic: grossly normal CN; normal strength, sensation, & tone  Psychiatric: mentation appears normal and affect normal/bright        Results:    Results from this visitNo results found for any visits on 12/30/21.

## 2021-12-31 LAB — SARS-COV-2 RNA RESP QL NAA+PROBE: POSITIVE

## 2022-01-13 ENCOUNTER — TELEPHONE (OUTPATIENT)
Dept: FAMILY MEDICINE | Facility: CLINIC | Age: 37
End: 2022-01-13
Payer: COMMERCIAL

## 2022-01-13 ENCOUNTER — ANCILLARY PROCEDURE (OUTPATIENT)
Dept: GENERAL RADIOLOGY | Facility: CLINIC | Age: 37
End: 2022-01-13
Attending: STUDENT IN AN ORGANIZED HEALTH CARE EDUCATION/TRAINING PROGRAM
Payer: COMMERCIAL

## 2022-01-13 ENCOUNTER — OFFICE VISIT (OUTPATIENT)
Dept: FAMILY MEDICINE | Facility: CLINIC | Age: 37
End: 2022-01-13
Payer: COMMERCIAL

## 2022-01-13 VITALS
HEART RATE: 94 BPM | RESPIRATION RATE: 18 BRPM | DIASTOLIC BLOOD PRESSURE: 78 MMHG | OXYGEN SATURATION: 95 % | TEMPERATURE: 98.3 F | SYSTOLIC BLOOD PRESSURE: 113 MMHG

## 2022-01-13 DIAGNOSIS — U07.1 COVID-19: Primary | ICD-10-CM

## 2022-01-13 LAB
ERYTHROCYTE [DISTWIDTH] IN BLOOD BY AUTOMATED COUNT: 12.6 % (ref 10–15)
HCT VFR BLD AUTO: 46.7 % (ref 40–53)
HGB BLD-MCNC: 15.5 G/DL (ref 13.3–17.7)
MCH RBC QN AUTO: 29.5 PG (ref 26.5–33)
MCHC RBC AUTO-ENTMCNC: 33.2 G/DL (ref 31.5–36.5)
MCV RBC AUTO: 89 FL (ref 78–100)
PLATELET # BLD AUTO: 421 10E3/UL (ref 150–450)
PROCALCITONIN SERPL-MCNC: 0.04 NG/ML (ref 0–0.49)
RBC # BLD AUTO: 5.26 10E6/UL (ref 4.4–5.9)
WBC # BLD AUTO: 7.8 10E3/UL (ref 4–11)

## 2022-01-13 PROCEDURE — 84145 PROCALCITONIN (PCT): CPT | Performed by: STUDENT IN AN ORGANIZED HEALTH CARE EDUCATION/TRAINING PROGRAM

## 2022-01-13 PROCEDURE — 99213 OFFICE O/P EST LOW 20 MIN: CPT | Mod: GC | Performed by: STUDENT IN AN ORGANIZED HEALTH CARE EDUCATION/TRAINING PROGRAM

## 2022-01-13 PROCEDURE — 85027 COMPLETE CBC AUTOMATED: CPT | Performed by: STUDENT IN AN ORGANIZED HEALTH CARE EDUCATION/TRAINING PROGRAM

## 2022-01-13 PROCEDURE — 71046 X-RAY EXAM CHEST 2 VIEWS: CPT | Mod: FY | Performed by: RADIOLOGY

## 2022-01-13 PROCEDURE — 36415 COLL VENOUS BLD VENIPUNCTURE: CPT | Performed by: STUDENT IN AN ORGANIZED HEALTH CARE EDUCATION/TRAINING PROGRAM

## 2022-01-13 NOTE — PROGRESS NOTES
Assessment & Plan     DM, type two. Stable. Continue present Rx    COVID-19  Risk factors include elevated BMI, DM.  Care plan impacted by social determinents  X-ray and Pro-Kobe obtained to rule out superimposed pneumonia, with no evidence of this.  He has no significant leukocytosis.  He may have contracted norovirus or some other diarrheal illness.  If he is not improving over the coming days would recommend follow-up visit, otherwise this could represent a COVID long-haul or syndrome.  - CBC with platelets  - Procalcitonin  - XR CHEST 2 VW    Freddy Combs MD  [facility]    Carmen Bay is a 36 year old who presents for the following health issues     HPI     Patient comes in with complaints of worsening after improvement with COVID-19 infection.  He was diagnosed with COVID on 12/30/2021 with fevers and respiratory illness.  He improved during the 2 weeks that followed and now comes in with repeat worsening symptoms, mostly GI in nature.    He reports watery diarrhea 15 x yesterday. 7-8 times today. Yellowish in color.  He is gassy bloated. Body hurts. Breathing causes a sharp substernal pain. No coughing. No SOB. Some SOB walking longer distances. Fever chills came back Monday but he is better with regards to this now..       Review of Systems   Constitutional, HEENT, cardiovascular, pulmonary, gi and gu systems are negative, except as otherwise noted.      Objective    /78 (BP Location: Left arm, Patient Position: Sitting, Cuff Size: Adult Large)   Pulse 94   Temp 98.3  F (36.8  C) (Oral)   Resp 18   SpO2 95%   There is no height or weight on file to calculate BMI.  Physical Exam   GENERAL: healthy, alert and no distress  NECK: no adenopathy, no asymmetry, masses, or scars and thyroid normal to palpation  RESP: lungs clear to auscultation - no rales, rhonchi or wheezes  CV: regular rate and rhythm, normal S1 S2, no S3 or S4, no murmur, click or rub, no  peripheral edema and peripheral pulses strong  ABDOMEN: soft, nontender, no hepatosplenomegaly, no masses and bowel sounds normal  MS: no gross musculoskeletal defects noted, no edema    Results for orders placed or performed in visit on 01/13/22   XR CHEST 2 VW     Status: None    Narrative    EXAM: XR CHEST 2 VW  LOCATION: [facility]  DATE/TIME: 1/13/2022 3:14 PM    INDICATION:  COVID-19.  COMPARISON: None.      Impression    IMPRESSION: Exclusion of the left costophrenic angle from view. Lungs are clear. No pleural effusion or pneumothorax. Normal heart size.       CBC with platelets     Status: Normal   Result Value Ref Range    WBC Count 7.8 4.0 - 11.0 10e3/uL    RBC Count 5.26 4.40 - 5.90 10e6/uL    Hemoglobin 15.5 13.3 - 17.7 g/dL    Hematocrit 46.7 40.0 - 53.0 %    MCV 89 78 - 100 fL    MCH 29.5 26.5 - 33.0 pg    MCHC 33.2 31.5 - 36.5 g/dL    RDW 12.6 10.0 - 15.0 %    Platelet Count 421 150 - 450 10e3/uL   Procalcitonin     Status: Normal   Result Value Ref Range    Procalcitonin 0.04 0.00 - 0.49 ng/mL

## 2022-01-13 NOTE — TELEPHONE ENCOUNTER
[facility] phone call message- general phone call:    Reason for call: He is still not feeling well at all he is having a lot of stomach pain and fever he would like to talk to a nurse because his job is harassing him he thinks he might need a note for them to understand he still has covid even though its been a couple of weeks    Action desired: call back.    Return call needed: Yes    OK to leave a message on voice mail? Yes    Advised patient to response may take up to 2 business days: Yes    Primary language: English      needed? No    Call taken on January 13, 2022 at 10:13 AM by David Tran

## 2022-01-13 NOTE — LETTER
[facility] [address] [place] [address] [phone]      January 13, 2022    RE:  Phu Loja                                                                                                                                                       [address]   [place] [address]            To whom it may concern:    Phu Loja is under my professional care for COVID-19.   He  Should be excused from work from 1/13-1/18          Sincerely,        Freddy Combs MD    [facility]

## 2022-01-13 NOTE — TELEPHONE ENCOUNTER
Patient diagnosed with COVID-19 on 12/30/21. Initially felt better, now feeling worse. Main complaint is abdominal pain, cough, fever/chills. Discussed while sometimes the cough does persist, it is relatively unusual to feel much better then feel bad again but with different symptoms. He is agreeable to coming in this afternoon for assessment and work excuse. Scheduled with Dr. Combs. ./RUBIO

## 2022-01-23 NOTE — PROGRESS NOTES
Preceptor Attestation:    I discussed the patient with the resident and evaluated the patient in person. I have verified the content of the note, which accurately reflects my assessment of the patient and the plan of care.   Supervising Physician:  Easton Hartley MD.

## 2022-05-04 ENCOUNTER — OFFICE VISIT (OUTPATIENT)
Dept: FAMILY MEDICINE | Facility: CLINIC | Age: 37
End: 2022-05-04
Payer: COMMERCIAL

## 2022-05-04 ENCOUNTER — OFFICE VISIT (OUTPATIENT)
Dept: PHARMACY | Facility: CLINIC | Age: 37
End: 2022-05-04
Payer: COMMERCIAL

## 2022-05-04 VITALS
RESPIRATION RATE: 16 BRPM | TEMPERATURE: 99.1 F | OXYGEN SATURATION: 98 % | DIASTOLIC BLOOD PRESSURE: 83 MMHG | HEART RATE: 100 BPM | BODY MASS INDEX: 34.7 KG/M2 | SYSTOLIC BLOOD PRESSURE: 121 MMHG | WEIGHT: 235 LBS

## 2022-05-04 DIAGNOSIS — E11.69 TYPE 2 DIABETES MELLITUS WITH OTHER SPECIFIED COMPLICATION, WITHOUT LONG-TERM CURRENT USE OF INSULIN (H): Primary | ICD-10-CM

## 2022-05-04 DIAGNOSIS — H53.8 BLURRED VISION: ICD-10-CM

## 2022-05-04 DIAGNOSIS — E11.9 TYPE 2 DIABETES MELLITUS WITHOUT COMPLICATION, WITHOUT LONG-TERM CURRENT USE OF INSULIN (H): Primary | ICD-10-CM

## 2022-05-04 LAB
CREAT UR-MCNC: 124 MG/DL
HBA1C MFR BLD: 8 % (ref 0–5.6)
MICROALBUMIN UR-MCNC: 7.9 MG/DL (ref 0–1.99)
MICROALBUMIN/CREAT UR: 63.7 MG/G CR

## 2022-05-04 PROCEDURE — 82043 UR ALBUMIN QUANTITATIVE: CPT

## 2022-05-04 PROCEDURE — 83036 HEMOGLOBIN GLYCOSYLATED A1C: CPT

## 2022-05-04 PROCEDURE — 99214 OFFICE O/P EST MOD 30 MIN: CPT | Mod: GC

## 2022-05-04 PROCEDURE — 99207 PR NO CHARGE LOS: CPT

## 2022-05-04 PROCEDURE — 36415 COLL VENOUS BLD VENIPUNCTURE: CPT

## 2022-05-04 RX ORDER — DULAGLUTIDE 0.75 MG/.5ML
0.75 INJECTION, SOLUTION SUBCUTANEOUS
Qty: 2 ML | Refills: 3 | Status: SHIPPED | OUTPATIENT
Start: 2022-05-04 | End: 2022-08-03

## 2022-05-04 RX ORDER — METFORMIN HCL 500 MG
2000 TABLET, EXTENDED RELEASE 24 HR ORAL
Qty: 360 TABLET | Refills: 3 | Status: SHIPPED | OUTPATIENT
Start: 2022-05-04 | End: 2022-11-08

## 2022-05-04 NOTE — Clinical Note
"I changed the plan language to \"Plan, after discussion w/ Dr. Camarena and preceptor:\" :) That's my consult go to if I made changes but can't bill MTM. "

## 2022-05-04 NOTE — PROGRESS NOTES
"  Assessment & Plan     Blurred vision  Type 2 diabetes mellitus with other specified complication, without long-term current use of insulin (H)  Patient experiencing morning blurry vision with polyuria, polydypsia, and polyphagia. Missed work due to inability to drive with blurred vision. Making lifestyle changes and motivated to improve diabetes.     -Diabetes education with PharmD  - dulaglutide (TRULICITY) 0.75 MG/0.5ML pen; Inject 0.75 mg Subcutaneous every 7 days  - metFORMIN (GLUCOPHAGE XR) 500 MG 24 hr tablet; Take 4 tablets (2,000 mg) by mouth daily (with breakfast) After titration. 1 pill daily x1week, 2 pills daily x1week, 3 pills daily x1week, then 4 pills daily thereafter  - MICROALBUMIN  - Hemoglobin A1c  - Adult Eye Referral; Future  - Albumin Random Urine Quantitative with Creat Ratio  -work note for blurred vision, will hopefully improve with increased sugar control in 1-2 weeks  -continue exercise and diet for weight loss  -Return in 1 month for dose titration of Trulicity and follow up of diabetes management    Ordering of each unique test  Prescription drug management  32 minutes spent on the date of the encounter doing chart review, history and exam, documentation and further activities per the note       BMI:   Estimated body mass index is 34.7 kg/m  as calculated from the following:    Height as of 9/8/21: 1.753 m (5' 9\").    Weight as of this encounter: 106.6 kg (235 lb).   Weight management plan: Discussed healthy diet and exercise guidelines    MEDICATIONS:   Orders Placed This Encounter   Medications     dulaglutide (TRULICITY) 0.75 MG/0.5ML pen     Sig: Inject 0.75 mg Subcutaneous every 7 days     Dispense:  2 mL     Refill:  3     metFORMIN (GLUCOPHAGE XR) 500 MG 24 hr tablet     Sig: Take 4 tablets (2,000 mg) by mouth daily (with breakfast) After titration. 1 pill daily x1week, 2 pills daily x1week, 3 pills daily x1week, then 4 pills daily thereafter     Dispense:  360 tablet     " Refill:  3     Medications Discontinued During This Encounter   Medication Reason     exenatide ER (BYDUREON BCISE) 2 MG/0.85ML auto-injector      empagliflozin (JARDIANCE) 10 MG TABS tablet      blood glucose monitoring (NO BRAND SPECIFIED) meter device kit      blood glucose (NO BRAND SPECIFIED) test strip           - Continue other medications without change  FUTURE APPOINTMENTS:       - Follow-up visit in 1 month for titration of Trulicity dose and for diabetes management follow up    Work on weight loss  Regular exercise        Berenice Camarena MD  [facility]    Carmen Bay is a 36 year old who presents for the following health issues     HPI     Phu is a 37 yo M who reports 4 day history of blurry vision in the morning. Vision blurriness occurs upon waking up from sleep or a nap, and improves as the day goes on.  He also reports worsening polydypsia, polyuria, polyphagia, and tingling in his fingers. Patient knew his sugars tend to be high with these symptoms, and checked blood sugar at >300. Had some nausea with taking Metformin.    Upon discussion with PharmD, patient reported that he was using diabetes medication as needed when experiencing hyperglycemic symptoms and checking sugars infrequently. Patient was educated by PharmD, and he is now very motivated to control diabetes better. Has already begun diet and exercise regimen to lose weight.       Review of Systems   Positive for blurred vision, polyuria, polyphagia, polydypsia, bilateral finger paresthesias.   Negative for headache, confusion, sore throat, cough, chest pain, palpitations, shortness of breath, nausea, diarrhea, vomiting, balance issues.       Objective    /83 (BP Location: Left arm, Patient Position: Sitting, Cuff Size: Adult Large)   Pulse 100   Temp 99.1  F (37.3  C) (Oral)   Resp 16   Wt 106.6 kg (235 lb)   SpO2 98%   BMI 34.70 kg/m    Body mass index is 34.7 kg/m .  Physical Exam   GENERAL:  healthy, alert and no distress  EYES: Eyes grossly normal to inspection, PERRL and conjunctivae and sclerae normal, retina looks normal on brief ophthalmoscope exam  NECK: no adenopathy, no asymmetry, masses, or scars and thyroid normal to palpation  RESP: lungs clear to auscultation - no rales, rhonchi or wheezes  CV: regular rate and rhythm, normal S1 S2, no S3 or S4, no murmur, click or rub, no peripheral edema and peripheral pulses strong  ABDOMEN: soft, nontender, no hepatosplenomegaly, no masses and bowel sounds normal  MS: no gross musculoskeletal defects noted, no edema  NEURO: sensory exam grossly normal    Results for orders placed or performed in visit on 05/04/22 (from the past 24 hour(s))   Hemoglobin A1c   Result Value Ref Range    Hemoglobin A1C 8.0 (H) 0.0 - 5.6 %       ----- Service Performed and Documented by Resident or Fellow ------

## 2022-05-04 NOTE — LETTER
[facility] [address] [place] [address]  [phone]          May 4, 2022    RE:  Phu Loja                                                                                                                                                       [address]   [place] [address]            To whom it may concern:    Phu Loja is under my professional care for primary health care maintenance and monitoring a recent symptom.  His condition should resolve within 1-2 weeks. By then, he will be able to return to work without restrictions.     If you have any questions or require clarification, please feel free to contact me.       Sincerely,        Berenice Camarena MD

## 2022-05-05 ENCOUNTER — TELEPHONE (OUTPATIENT)
Dept: FAMILY MEDICINE | Facility: CLINIC | Age: 37
End: 2022-05-05
Payer: COMMERCIAL

## 2022-05-05 NOTE — TELEPHONE ENCOUNTER
[facility] phone call message- general phone call:    Reason for call: the Pt called to ask for a letter with more specific restrictions for his job they need more information     Return call needed: Yes    OK to leave a message on voice mail? Yes    Primary language: English      needed? No    Call taken on May 5, 2022 at 11:42 AM by Chente Hoyt

## 2022-05-05 NOTE — PROGRESS NOTES
"Clinical Pharmacy Consult:                                                    Phu Loja is a 36 year old male seen for a clinical pharmacist consult.  He was referred to me from [facility].     Reason for Consult: DM Optimization    Discussion: A1C above goal of < 7%. Presents with chief complaint of worsening blurry vision. Patient reports testing BG a few times at work with values in the 300s. Was prescribed metformin and Bydureon, but has been using \"as needed\" when he feels like his sugar is high. Last dispsense was 7/2021 for both medications. Will occasionally take 3-4 metformin when he feels his sugar is high  (not taking daily). Work has testing capabilities and will take it on this amount when he runs high there as well. Complains of severe GI side effects. Likely because patient has not titrated. Trouble with Bydureon pens--many of the pens broke and he stopped using it a while ago. Appears to have been prescribed Jardiance but never started.    Patient has commercial insurance that will pay for Trulicity--may benefit from switching to an easier pen that has capabilities of titration. Would also benefit from titrating up to goal dose of 2000 mg/day of metformin. Recommend to hold off on the Jardiance in light of other medication changes today.    Additionally, patient is very interested in making dietary changes. Would benefit from diabetes education and possibly CGM. Patient's work provides people with diabetes free CGM and BGM supplies. Per his formulary, CGM would only be covered if on insulin.    Plan, after discussion w/ Dr. Camarena and preceptor:  1. Titrate up metformin XR 2000 mg over next 4 weeks as outlined in the AVS  2. Switch from Bydureon to weekly Trulicity. Provided education on how to administer with demo pen today.  3. Take medications consistantly to keep diabetes under control! Trulicity will help with weight loss.  4. Ask work about providing CGM    Antoine Jauregui, PharmD, [facility]  PGY1 " Ambulatory Care Pharmacy Resident

## 2022-05-09 NOTE — PROGRESS NOTES
I have verified the content of the note, which accurately reflects my assessment of the patient and the plan of care.   Machelle Silva, BRENDA, PharmD

## 2022-08-03 ENCOUNTER — OFFICE VISIT (OUTPATIENT)
Dept: FAMILY MEDICINE | Facility: CLINIC | Age: 37
End: 2022-08-03
Payer: COMMERCIAL

## 2022-08-03 VITALS
HEIGHT: 69 IN | OXYGEN SATURATION: 95 % | WEIGHT: 228.6 LBS | TEMPERATURE: 98.5 F | SYSTOLIC BLOOD PRESSURE: 121 MMHG | BODY MASS INDEX: 33.86 KG/M2 | RESPIRATION RATE: 16 BRPM | HEART RATE: 90 BPM | DIASTOLIC BLOOD PRESSURE: 78 MMHG

## 2022-08-03 DIAGNOSIS — J22 LOWER RESPIRATORY INFECTION: Primary | ICD-10-CM

## 2022-08-03 DIAGNOSIS — E11.69 TYPE 2 DIABETES MELLITUS WITH OTHER SPECIFIED COMPLICATION, WITHOUT LONG-TERM CURRENT USE OF INSULIN (H): ICD-10-CM

## 2022-08-03 PROCEDURE — 99213 OFFICE O/P EST LOW 20 MIN: CPT | Mod: GC | Performed by: STUDENT IN AN ORGANIZED HEALTH CARE EDUCATION/TRAINING PROGRAM

## 2022-08-03 RX ORDER — DULAGLUTIDE 0.75 MG/.5ML
0.75 INJECTION, SOLUTION SUBCUTANEOUS
Qty: 2 ML | Refills: 3 | Status: SHIPPED | OUTPATIENT
Start: 2022-08-03 | End: 2022-11-08

## 2022-08-03 NOTE — PROGRESS NOTES
There are no exam notes on file for this visit.    Assessment & Plan    1. Lower respiratory infection  Patient got a lower respiratory infection from his children. They all tested negative for covid. Symptoms started 4 days ago. Patient declines covid testing.   - letter given to patient to return to work 8/5/22, that will satisfy 5 days of quarantine for covid-19 precautions w/o negative test    2. Type 2 diabetes mellitus with other specified complication, without long-term current use of insulin (H)  Will refill today at current dose.   - dulaglutide (TRULICITY) 0.75 MG/0.5ML pen; Inject 0.75 mg Subcutaneous every 7 days  Dispense: 2 mL; Refill: 3  - recommended following up with PCP or Dr. Camarena for DM management and evaluation of need to increase dose of trulicity vs stay at current dose as fasting sugars now 100-106.    - will be due for repeat HA1c with medication change      For today's visit, I reviewed patient's PMH, PSH, FH, Medications, Allergies, Immunizations.   I reviewed most recent HA1c.  20 minutes spent on the date of the encounter doing chart review, history and exam, documentation and further activities per the note    Benita Behm, MD PGY3  [facility]  08/03/22    I precepted today with Dr. Pinzon.    Subjective   Phu Loja is a 37 year old who presents for the following health issues: cough    HPI    DM:  Out of trulicity, needs a refill. It has improved fasting sugars to 100-106. He feels much better in the morning. He has lost 7 lb since starting trulicity. He is taking 0.75 mg. Has not missed a dose.     Cough:  His kids just got covid tested and were all negative. He has a 1,6 and 9 year old. The have coughed on him and got him sick. He became symptomatic 4 days ago, felt worse 3 days ago. He is feeling much better now. He has a mild cough, much improved since 4 days ago. He has malaise, now improved. He has not had fevers, headaches, rhinorrhea,  "congestion, sore throat, chest pain, shortness of breath.     Review of Systems  Constitutional, HEENT, cardiovascular, pulmonary, GI, , musculoskeletal, neuro, skin, endocrine and psych systems are negative, except as otherwise noted.    Objective   /78 (BP Location: Left arm, Patient Position: Sitting, Cuff Size: Adult Regular)   Pulse 90   Temp 98.5  F (36.9  C) (Oral)   Resp 16   Ht 1.761 m (5' 9.33\")   Wt 103.7 kg (228 lb 9.6 oz)   SpO2 95%   BMI 33.44 kg/m    Physical Exam    Constitutional: Awake, alert, cooperative, no apparent distress  Eyes: Lids and lashes normal, pupils equal, round and reactive to light, extra ocular muscles intact, sclera clear, conjunctiva normal.  ENT: Normocephalic, without obvious abnormality, atraumatic, external ears without lesions.  Neck: Supple, symmetrical, trachea midline, skin normal.  Lungs: No audible wheezing or stridor, no increased work of breathing, talks in full sentences, good air exchange, clear to auscultation bilaterally, no crackles or wheezing.  Cardiovascular: Regular rate and rhythm, normal S1 and S2, no S3 or S4, and no murmur noted.  Abdomen: Normal bowel sounds, soft, non-distended  Musculoskeletal: Full range of motion noted. Tone is normal.  Neurologic: Awake, alert, oriented to name, place and time.  Cranial nerves II-XII are grossly intact. Gait is normal.  Neuropsychiatric: Normal affect, mood, orientation, and memory. Coherent speech, normal rate and volume, able to articulate logical thoughts, able to abstract reason, no tangential thoughts, no hallucinations or delusions   Skin: No visible rashes, erythema, pallor, petechia or purpura.    ----- Service Performed and Documented by Resident or Fellow ------      "

## 2022-08-03 NOTE — PROGRESS NOTES
"Preceptor attestation:  Vital signs reviewed: /78 (BP Location: Left arm, Patient Position: Sitting, Cuff Size: Adult Regular)   Pulse 90   Temp 98.5  F (36.9  C) (Oral)   Resp 16   Ht 1.761 m (5' 9.33\")   Wt 103.7 kg (228 lb 9.6 oz)   SpO2 95%   BMI 33.44 kg/m      Patient seen, evaluated, and discussed with the resident.  I have verified the content of the note, which accurately reflects my assessment of the patient and the plan of care.    Supervising physician: Malena Pinzon MD  [facility]  "

## 2022-08-03 NOTE — LETTER
[facility] [address] [place] [address]  Phone: [phone]  Fax: [phone]    August 3, 2022        Phu Loja  [address]   [place] [address]          To whom it may concern:    RE: Phu Loja    Patient was seen and treated today at our clinic and missed work.  Patient may return to work 8/5/22 with the following:  No working or lifting restrictions    Please contact me for questions or concerns.      Sincerely,        Benita Kay Behm, MD

## 2022-10-17 DIAGNOSIS — E11.69 TYPE 2 DIABETES MELLITUS WITH OTHER SPECIFIED COMPLICATION, WITHOUT LONG-TERM CURRENT USE OF INSULIN (H): Primary | ICD-10-CM

## 2022-10-18 ENCOUNTER — TELEPHONE (OUTPATIENT)
Dept: FAMILY MEDICINE | Facility: CLINIC | Age: 37
End: 2022-10-18

## 2022-10-18 NOTE — TELEPHONE ENCOUNTER
1/12/2018      Lashonda PETER Fernando  [address]  [facility] [address]    Dear Ms. Fernando,    Your procedure is scheduled with Dr. Abad Trejo on February 2, 2018 at 7:30 am at:    [facility]   [address] [place] [address]  [phone]      Please register at [facility] on February 2, 2018 at 5:30 am.    You can expect to be contacted 1 to 3 days prior to the surgery to confirm arrival and surgery time. These times may change due to various OR schedule needs. We will call you ASAP if this happens.    The following appointment(s) have been scheduled for you:     · Post-op with Dr. Abad Trejo at the [facility], Orthopaedics, [address] on February 22, 2018 at 11:30 am.    To better prepare for your surgery, please follow these instructions:      Do not take any anti--inflammatory medications (aspirin, ibuprofen, naproxen, etc) for 5 days before surgery.  Acetaminophen (Tylenol) is acceptable.  You may need to use crutches/walker following your procedure for balance.  Please be sure to bring crutches/walker to the procedure.  You will need someone to drive you home and remain with you up to 24 hours after you have been discharged.  Please understand that you may be waiting for your procedure, but we need you here early to get you prepared and/or if there are changes in the schedule.  Contact your primary care physician to schedule a preoperative History & Physical and lab work 2 weeks prior to surgery.  · Do not eat or drink after midnight the night before your surgery.      Our PreAuthorization Team will be contacting your insurance company to ensure that they have all of the information they need from us.  We will let you know if we encounter any issues or have any concerns in advance of your scheduled surgery.  If you have any  questions regarding your surgery authorization, please check with your insurance company or call  Prior Authorization needed on:  dulaglutide (TRULICITY) 0.75 MG/0.5ML pen        Pharmacy confirmed as   [id number] - [place], [place] - [address] [facility] [address]  [address] [place] [address]  Phone: [phone] Fax: [phone]    [id number] - [place], [place] - [address] [facility] [address] [place] [address]  Phone: [phone] Fax: [phone]    [id number] - [place], [place] - [address] AT [facility] [address]  [address]  [place] [address]  Phone: [phone] Fax: [phone]  : Yes    Insurance Name:  [employer]  Insurance Phone:   Insurance Patient ID: [id number]    Alternatives Suggested:  Please advise if you would like to start a PA or send an alternative.     Ugo Veronica CMA October 18, 2022 at 9:49 AM     our PreAuth Inquiry Line at [phone]. (Please see attached for more information.)    If you have any work related and/or disability forms that need to be completed, please contact the Forms Completion Department at [phone]. Forms can be dropped off at any of our [facility] locations. Please be advised that it can take 7 to 10 business days to complete these requests.    If you have questions regarding the procedure, medications, rehab, etc., please contact the nursing staff at [facility] scheduling line at [phone].    If you have any scheduling questions or need to reschedule, please contact me at the telephone number and extension listed below.       Thank you,        Bhaskar at [phone]  Surgery Scheduler for Dr. Abad Trejo  [facility]    Enclosures: Crutches handout    \"Help us grow our quality of service. We want to improve - and you can help us. You may receive a survey in the mail. This is your opportunity to tell us what we did well, and where we could use some improvement. We value your input.\"                                                  Insurance Authorization Need to Know’s    Prior to your surgical procedure, our team will contact your Insurance Company to initiate a PreAuthorization request.      This is not a guarantee of payment from your insurance company, but rather a step taken to ensure that we have all of the information and documentation for them to confirm the procedure is one that is eligible for coverage under your plan.    We will contact you if we either need more information from you to fulfill the requirements of your insurance company, or if we need to discuss any concerns that may lead to postponement or cancellation of your procedure.     What to do if… My Insurance Changes:  If, at any time, your insurance company, plan or even card changes… Please call our office so that our team can be sure to update your records.  We will need  to make sure to submit any PreAuth or allan to the correct, up-to-date insurance plan.      What to do if… My Insurance Requires A Referral:  If your insurance company requires a Referral for Specialty Care or to see a Specialist, you will need to confirm with them if you have one on file.    - If your insurance carrier does not have a referral, then you will need to contact your Primary Care Physician to have one directly submitted to your insurance company ASAP.    - Without a referral on file, your insurance company will not Pre-Authorize your surgery and may not cover any of your care with our specialty.    What to do if… I have a Work Comp (W/C) Claim:  If you have a W/C claim, please be sure to provide our reception team with the information you have regarding your claim ASAP.  We will contact your W/C carrier/adjustor to inform them of your upcoming surgery and check the status of your claim (open vs closed).  We will let you know if they advise of any concerns or issues with your claim.  - Even if you have an open W/C claim, please also provide us with your personal/family insurance.  We will want to be sure this plan is loaded into your account.  We always PreAuth with personal insurance as a back-up to W/C.  Otherwise, if W/C doesn’t cover something along the way, you will receive a bill for the services.    What to do if… I have Month-to-Month Coverage/Premiums:  If you have an insurance plan that is paid for month to month, or is subject to plan change on a monthly basis, please be aware we cannot initiate PreAuth until just before the month of your surgery, as your insurance company will need to verify your premium payments/eligibility first.    What to do if… I Do Not Have Insurance Coverage:  If you do not have insurance coverage, please call our Patient Contact Center:  [phone] or a  at the hospital to discuss possible coverage options and/or billing options.     What to do  if… I have other Insurance/Billing questions:If you have questions regarding our billing process, setting up payment plans, our fee schedule, etc… Please call our Patient Contact Center:  [phone].    If you need information regarding your level of benefits or out-of-pocket expenses, please contact your insurance company directly.  They can also confirm for you whether or not we (the surgeon and the hospital/surgery center) are in your plan’s preferred network (aka ‘in-network’).

## 2022-10-18 NOTE — TELEPHONE ENCOUNTER
Pt called back today and he states he has been waiting on this for a long time.  I tried to tell him that we were just notified this morning.  He said he is in need of this medication asap.

## 2022-10-18 NOTE — TELEPHONE ENCOUNTER
Prior Authorization Retail Medication Request    Medication/Dose: dulaglutide (TRULICITY) 0.75 MG/0.5ML pen  ICD code (if different than what is on RX):  Type 2 diabetes mellitus with other specified complication, without long-term current use of insulin (H) [E11.69]   Previously Tried and Failed:    Rationale:      Insurance Name:  [employer]  Insurance ID:  [id number]      Pharmacy Information (if different than what is on RX)  Name:    Phone:

## 2022-10-19 NOTE — TELEPHONE ENCOUNTER
PA Initiation    Medication: dulaglutide (TRULICITY) 0.75 MG/0.5ML pen  Insurance Company: [employer] - Phone [phone] Fax [phone]  Pharmacy Filling the Rx: [id number] - [place], [place] - [facility]  Filling Pharmacy Phone: [phone]  Filling Pharmacy Fax: [phone]  Start Date: 10/19/2022    JUDY ROSARIO Key: [id number] - PA Case ID: [id number]

## 2022-10-19 NOTE — TELEPHONE ENCOUNTER
Prior Authorization Approval    Authorization Effective Date: 9/19/2022  Authorization Expiration Date: 10/19/2023  Medication: dulaglutide (TRULICITY) 0.75 MG/0.5ML pen  Approved Dose/Quantity:   Reference #: [id number]   Insurance Company: [employer] - Phone [phone] Fax [phone]  Which Pharmacy is filling the prescription (Not needed for infusion/clinic administered): [id number] - [place], [place] - [address]  Pharmacy Notified: Yes  Patient Notified: Yes

## 2022-10-19 NOTE — TELEPHONE ENCOUNTER
Copying Dr. Behm's note for documentation purposes:    Exenatide was trialed in the past, patient has been taking dulaglutide prior to needing a prior authorization. For his continuity in sticking with the same medication dispenser/pen, let's please move forward with the prior authorization.     Thanks you,     Dr. Behm.

## 2022-11-08 ENCOUNTER — OFFICE VISIT (OUTPATIENT)
Dept: FAMILY MEDICINE | Facility: CLINIC | Age: 37
End: 2022-11-08
Payer: COMMERCIAL

## 2022-11-08 VITALS
SYSTOLIC BLOOD PRESSURE: 122 MMHG | WEIGHT: 229 LBS | OXYGEN SATURATION: 98 % | HEART RATE: 106 BPM | TEMPERATURE: 98.2 F | DIASTOLIC BLOOD PRESSURE: 78 MMHG | RESPIRATION RATE: 18 BRPM | HEIGHT: 69 IN | BODY MASS INDEX: 33.92 KG/M2

## 2022-11-08 DIAGNOSIS — D17.30 LIPOMA OF SKIN AND SUBCUTANEOUS TISSUE: Primary | ICD-10-CM

## 2022-11-08 DIAGNOSIS — E11.69 TYPE 2 DIABETES MELLITUS WITH OTHER SPECIFIED COMPLICATION, WITHOUT LONG-TERM CURRENT USE OF INSULIN (H): ICD-10-CM

## 2022-11-08 DIAGNOSIS — E11.9 TYPE 2 DIABETES MELLITUS WITHOUT COMPLICATION, WITHOUT LONG-TERM CURRENT USE OF INSULIN (H): ICD-10-CM

## 2022-11-08 LAB
ANION GAP SERPL CALCULATED.3IONS-SCNC: 13 MMOL/L (ref 7–15)
BUN SERPL-MCNC: 14.6 MG/DL (ref 6–20)
CALCIUM SERPL-MCNC: 9.3 MG/DL (ref 8.6–10)
CHLORIDE SERPL-SCNC: 99 MMOL/L (ref 98–107)
CHOLEST SERPL-MCNC: 160 MG/DL
CREAT SERPL-MCNC: 0.92 MG/DL (ref 0.67–1.17)
DEPRECATED HCO3 PLAS-SCNC: 22 MMOL/L (ref 22–29)
GFR SERPL CREATININE-BSD FRML MDRD: >90 ML/MIN/1.73M2
GLUCOSE SERPL-MCNC: 348 MG/DL (ref 70–99)
HBA1C MFR BLD: 9.4 % (ref 0–5.6)
HDLC SERPL-MCNC: 25 MG/DL
LDLC SERPL CALC-MCNC: 78 MG/DL
NONHDLC SERPL-MCNC: 135 MG/DL
POTASSIUM SERPL-SCNC: 4.5 MMOL/L (ref 3.4–5.3)
SODIUM SERPL-SCNC: 134 MMOL/L (ref 136–145)
TRIGL SERPL-MCNC: 286 MG/DL

## 2022-11-08 PROCEDURE — 99214 OFFICE O/P EST MOD 30 MIN: CPT | Mod: GC | Performed by: STUDENT IN AN ORGANIZED HEALTH CARE EDUCATION/TRAINING PROGRAM

## 2022-11-08 PROCEDURE — 80048 BASIC METABOLIC PNL TOTAL CA: CPT | Performed by: STUDENT IN AN ORGANIZED HEALTH CARE EDUCATION/TRAINING PROGRAM

## 2022-11-08 PROCEDURE — 83036 HEMOGLOBIN GLYCOSYLATED A1C: CPT | Performed by: STUDENT IN AN ORGANIZED HEALTH CARE EDUCATION/TRAINING PROGRAM

## 2022-11-08 PROCEDURE — 82043 UR ALBUMIN QUANTITATIVE: CPT | Performed by: STUDENT IN AN ORGANIZED HEALTH CARE EDUCATION/TRAINING PROGRAM

## 2022-11-08 PROCEDURE — 36415 COLL VENOUS BLD VENIPUNCTURE: CPT | Performed by: STUDENT IN AN ORGANIZED HEALTH CARE EDUCATION/TRAINING PROGRAM

## 2022-11-08 PROCEDURE — 80061 LIPID PANEL: CPT | Performed by: STUDENT IN AN ORGANIZED HEALTH CARE EDUCATION/TRAINING PROGRAM

## 2022-11-08 RX ORDER — DULAGLUTIDE 0.75 MG/.5ML
0.75 INJECTION, SOLUTION SUBCUTANEOUS
Qty: 6 UNITS | Refills: 3 | OUTPATIENT
Start: 2022-11-08 | End: 2022-11-08

## 2022-11-08 RX ORDER — METFORMIN HCL 500 MG
2000 TABLET, EXTENDED RELEASE 24 HR ORAL
Qty: 360 TABLET | Refills: 3 | Status: SHIPPED | OUTPATIENT
Start: 2022-11-08

## 2022-11-08 NOTE — PROGRESS NOTES
Assessment & Plan     Type 2 diabetes mellitus with other specified complication, without long-term current use of insulin (H)  Lab Results   Component Value Date    A1C 9.4 11/08/2022    A1C 8.0 05/04/2022    A1C 9.8 07/28/2021     Interval A1c increased from 8.0-9.4, likely due to underdosing of metformin and also he is not been taking his Trulicity due to cost prohibition.  We discussed this case with clinical pharmacy we will try to have another GLP-1 agonist covered, will try by cheryle for coverage and see if this is more affordable for the patient.  Also recommended that he increase his metformin dosage up to 2000 mg/day with a titration regimen as detailed below.  Interval diabetes labs were obtained today as well.  We will also start Jardiance for A1c control and renal protection.  - Hemoglobin A1c  - Lipid Panel  - Basic metabolic panel  - Albumin Random Urine Quantitative with Creat Ratio  - metFORMIN (GLUCOPHAGE XR) 500 MG 24 hr tablet; Take 4 tablets (2,000 mg) by mouth daily (with breakfast) After titration. 1 pill daily x1week, 2 pills daily x1week, 3 pills daily x1week, then 4 pills daily thereafter  - empagliflozin (JARDIANCE) 10 MG TABS tablet; Take 1 tablet (10 mg) by mouth daily  - exenatide ER (BYDUREON BCISE) 2 MG/0.85ML auto-injector; Inject 2 mg Subcutaneous every 7 days  - Adult Eye  Referral; Future    Lipoma of skin and subcutaneous tissue  For excision of subcutaneous lipoma unsuccessfully excised in the office setting on 4/2021.  - Adult General Surg Referral; Future    Return in about 4 weeks (around 12/6/2022).    Manuel Jordan MD  [facility] ARETHA Bay is a 37 year old, presenting for the following health issues:  RECHECK (Follow up diabetes)      HPI     Diabetes Follow-up      How often are you checking your blood sugar? Not at all    What concerns do you have today about your diabetes? Other: going to the bathroom too much     Do you  "have any of these symptoms? (Select all that apply)  Excessive thirst    Have you had a diabetic eye exam in the last 12 months? No    Was doing well on Trulicity and metformin, only taking 500 mg of metformin at a time. Also trulicity after 60 days began to cost 600$ which was cost prohibitive to him.  He is endorsing polyuria but otherwise feels normal and no other acute complaints.     He also notes an area over his right thigh, previously diagnosed with a lipoma and it was unsuccessfully excised in clinic today.  He was recommended to follow-up with a surgeon for further removal however he was unable to connect with the surgeon and it has been over a year and the mass is grown.  He would like to take care of it today.  BP Readings from Last 2 Encounters:   11/08/22 122/78   08/03/22 121/78     Hemoglobin A1C (%)   Date Value   11/08/2022 9.4 (H)   05/04/2022 8.0 (H)     LDL Cholesterol Calculated (mg/dL)   Date Value   11/08/2022 78     Review of Systems   Constitutional, HEENT, cardiovascular, pulmonary, gi and gu systems are negative, except as otherwise noted.      Objective    /78 (BP Location: Right arm, Patient Position: Sitting, Cuff Size: Adult Large)   Pulse 106   Temp 98.2  F (36.8  C) (Tympanic)   Resp 18   Ht 1.74 m (5' 8.5\")   Wt 103.9 kg (229 lb)   SpO2 98%   BMI 34.31 kg/m    Body mass index is 34.31 kg/m .  Physical Exam   GENERAL: healthy, alert and no distress  EYES: Eyes grossly normal to inspection, PERRL and conjunctivae and sclerae normal  RESP: lungs clear to auscultation - no rales, rhonchi or wheezes  CV: regular rate and rhythm, normal S1 S2, no S3 or S4, no murmur, click or rub, no peripheral edema and peripheral pulses strong  ABDOMEN: soft, nontender, no hepatosplenomegaly, no masses and bowel sounds normal  MS: no gross musculoskeletal defects noted, no edema  SKIN: orange-sized lipoma over the left thigh, overlying scar.  NEURO: Normal strength and tone, mentation " intact and speech normal  PSYCH: mentation appears normal, affect normal/bright    Results for orders placed or performed in visit on 11/08/22   Hemoglobin A1c     Status: Abnormal   Result Value Ref Range    Hemoglobin A1C 9.4 (H) 0.0 - 5.6 %   Lipid Panel     Status: Abnormal   Result Value Ref Range    Cholesterol 160 <200 mg/dL    Triglycerides 286 (H) <150 mg/dL    Direct Measure HDL 25 (L) >=40 mg/dL    LDL Cholesterol Calculated 78 <=100 mg/dL    Non HDL Cholesterol 135 (H) <130 mg/dL    Narrative    Cholesterol  Desirable:  <200 mg/dL    Triglycerides  Normal:  Less than 150 mg/dL  Borderline High:  150-199 mg/dL  High:  200-499 mg/dL  Very High:  Greater than or equal to 500 mg/dL    Direct Measure HDL  Female:  Greater than or equal to 50 mg/dL   Male:  Greater than or equal to 40 mg/dL    LDL Cholesterol  Desirable:  <100mg/dL  Above Desirable:  100-129 mg/dL   Borderline High:  130-159 mg/dL   High:  160-189 mg/dL   Very High:  >= 190 mg/dL    Non HDL Cholesterol  Desirable:  130 mg/dL  Above Desirable:  130-159 mg/dL  Borderline High:  160-189 mg/dL  High:  190-219 mg/dL  Very High:  Greater than or equal to 220 mg/dL   Basic metabolic panel     Status: Abnormal   Result Value Ref Range    Sodium 134 (L) 136 - 145 mmol/L    Potassium 4.5 3.4 - 5.3 mmol/L    Chloride 99 98 - 107 mmol/L    Carbon Dioxide (CO2) 22 22 - 29 mmol/L    Anion Gap 13 7 - 15 mmol/L    Urea Nitrogen 14.6 6.0 - 20.0 mg/dL    Creatinine 0.92 0.67 - 1.17 mg/dL    Calcium 9.3 8.6 - 10.0 mg/dL    Glucose 348 (H) 70 - 99 mg/dL    GFR Estimate >90 >60 mL/min/1.73m2   Albumin Random Urine Quantitative with Creat Ratio     Status: Abnormal   Result Value Ref Range    Albumin Urine mg/L 16.7 mg/L    Albumin Urine mg/g Cr 30.42 (H) 0.00 - 17.00 mg/g Cr    Creatinine Urine mg/dL 54.9 mg/dL

## 2022-11-09 LAB
CREAT UR-MCNC: 54.9 MG/DL
MICROALBUMIN UR-MCNC: 16.7 MG/L
MICROALBUMIN/CREAT UR: 30.42 MG/G CR (ref 0–17)

## 2022-11-16 NOTE — PATIENT INSTRUCTIONS
11/16/22   EYE REFERRAL   (Medical insurance not accepted [facility] through [facility])    [facility]  Phone:[phone]  Fax:  [phone]    If you cannot make this appointment, please contact [phone] to reschedule    Faxed demographics and referral to [phone]. They will contact pt to schedule.    Felecia Rojas

## 2023-03-24 ENCOUNTER — DOCUMENTATION ONLY (OUTPATIENT)
Dept: FAMILY MEDICINE | Facility: CLINIC | Age: 38
End: 2023-03-24
Payer: COMMERCIAL

## 2023-03-24 NOTE — PROGRESS NOTES
To be completed in Nursing note:    Please reference list for forms that require a visit for completion.  Please remind patients that providers are given 3-5 business days to complete and return forms.      Form type:  Treatment and Self Management Plan Request Form  Date form received:     Date form completed by Physician:  3.24.23    How was form returned to patient (mailed, faxed, or at  for patient to ):  Fax back at [phone]    Date form mailed/faxed/left at  for patient and sent to HIM for scanning:  Forms faxed 3.24.23      Once form is left for patient, faxed, or mailed PCS will then close the documentation only encounter.

## 2023-06-15 ENCOUNTER — TELEPHONE (OUTPATIENT)
Dept: FAMILY MEDICINE | Facility: CLINIC | Age: 38
End: 2023-06-15
Payer: COMMERCIAL

## 2023-06-16 ENCOUNTER — OFFICE VISIT (OUTPATIENT)
Dept: SURGERY | Facility: CLINIC | Age: 38
End: 2023-06-16
Payer: COMMERCIAL

## 2023-06-16 VITALS — SYSTOLIC BLOOD PRESSURE: 122 MMHG | BODY MASS INDEX: 33.56 KG/M2 | WEIGHT: 224 LBS | DIASTOLIC BLOOD PRESSURE: 74 MMHG

## 2023-06-16 DIAGNOSIS — D17.30 LIPOMA OF SKIN AND SUBCUTANEOUS TISSUE: ICD-10-CM

## 2023-06-16 PROCEDURE — 99203 OFFICE O/P NEW LOW 30 MIN: CPT | Performed by: SPECIALIST

## 2023-06-16 RX ORDER — ACETAMINOPHEN 325 MG/1
975 TABLET ORAL ONCE
Status: CANCELLED | OUTPATIENT
Start: 2023-06-16 | End: 2023-06-16

## 2023-06-16 NOTE — LETTER
6/16/2023         RE: Phu Loja  [address]  [place] [address]        Dear Colleague,    Thank you for referring your patient, Phu Loja, to the [facility] [place]. Please see a copy of my visit note below.        [facility] Surgery Consult    HPI:    38 year old year old male who I have been consulted by Karen Soto for evaluation of Consult (Lipoma on R thigh- no images )  He has a mass of right thigh which was attempted to be excised in other clinic but unsuccessful and here for removal.  Appears as though it may be residual tissue.  And the lump in the mass in that area.  Causes him some symptoms he is diabetic.    Allergies:Patient has no known allergies.    Past Medical History:   Diagnosis Date     Type 2 diabetes mellitus with other specified complication, without long-term current use of insulin (H) 9/2/2021     Type 2 diabetes mellitus without complication, without long-term current use of insulin (H) 9/2/2021       History reviewed. No pertinent surgical history.    CURRENT MEDS:  Current Outpatient Medications   Medication     metFORMIN (GLUCOPHAGE XR) 500 MG 24 hr tablet     exenatide ER (BYDUREON BCISE) 2 MG/0.85ML auto-injector     No current facility-administered medications for this visit.       Family History   Problem Relation Age of Onset     Diabetes No family hx of      Coronary Artery Disease No family hx of      Hypertension No family hx of      Other Cancer No family hx of      Asthma No family hx of         reports that he has quit smoking. He has never used smokeless tobacco. He reports that he does not currently use alcohol. He reports that he does not use drugs.    Review of Systems:  Mass in the right thigh. Otherwise twelve system of review is negative.      OBJECTIVE:  Vitals:    06/16/23 1438   BP: 122/74   Weight: 101.6 kg (224 lb)     Body mass index is 33.56 kg/m .    EXAM:  GENERAL: This is  a well-developed 38 year old male who appears his stated age  HEAD: normocephalic  HEENT: Pupils equal and reactive bilaterally  MOUTH: mucus membranes intact  CARDIAC: RRR without murmur  CHEST/LUNG:  Clear to auscultation  ABDOMEN: Soft, nontender, nondistended, no masses  EXTREMITIES: Grossly normal, warm, right thigh mass 2x4 cm area  NEUROLOGIC: Focally intact, nonfocal  INTEGUMENT: No open lesions or ulcers  VASCULAR: Pulses intact, symmetrical upper and lower extremities.        LABS:  Lab Results   Component Value Date    WBC 7.8 01/13/2022    HGB 15.5 01/13/2022    HGB 16.6 05/04/2017    HCT 46.7 01/13/2022    HCT 49.5 05/04/2017    MCV 89 01/13/2022    MCV 96.6 05/04/2017     01/13/2022       Lab Results   Component Value Date    ALT 49 (H) 09/08/2021    AST 30 09/08/2021    ALKPHOS 83 09/08/2021          Assessment/Plan:   Mass right thigh    Plan excision under local MAC anesthesia same-day surgery setting.  Discussed and answered all questions with him today.  We will get set up in the very near future.    No follow-ups on file.    Naman Marie MD  General Surgery [phone]  Vascular Surgery [phone]          Again, thank you for allowing me to participate in the care of your patient.        Sincerely,        Naman Marie MD

## 2023-06-16 NOTE — PATIENT INSTRUCTIONS
Now that you and your surgeon have discussed treatment options, the next step is to schedule surgery.  Our surgery scheduler will reach out to you to coordinate your procedure once an order for surgery is placed. If you prefer, you can call our surgery scheduler when you are ready; usually you have up to six months to schedule before your order expires and you need to be seen again.  Please allow 48 hours for a return call; surgery scheduling is prioritized based on diagnosis, with cancer and life-threatening conditions scheduled first.   We appreciate your understanding and look forward to helping you get scheduled!

## 2023-06-27 ENCOUNTER — TELEPHONE (OUTPATIENT)
Dept: SURGERY | Facility: CLINIC | Age: 38
End: 2023-06-27
Payer: COMMERCIAL

## 2023-06-27 NOTE — LETTER
We've received instruction to get you scheduled for surgery with Dr Marie. We have that arranged as follows:     Pre-op Physical:  Call your primary clinic to schedule.    Surgery Date: 7/13/2023     Location: [place] [address]    Approximate Arrival Time: 7:00 am  (Unless instructed differently by the pre-op call nurse)     Prep Tasks and Info:     A pre-op physical with your primary care doctor is required before surgery. This must be 10-30 days before surgery.  Since it required by anesthesia, your surgery will be cancelled if it's not done. Call your clinic asap to get this scheduled.    Review your medications with your primary care or prescribing physician; they will advise you which meds to stop and when, and when you can resume taking.  Certain medications like blood thinners, need to be stopped in advance of surgery to proceed safely.    Please shower the evening before and morning of surgery with Hibiclens soap.  This can be found at your local pharmacy.     Fasting instructions will be provided by the pre-op nurse who will call you 1-3 days before surgery.  Typically we advise normal food up to 8 hours before you arrive for surgery. Clear liquids only from then until 2 hours before you arrive surgery then nothing at all by mouth.  The nurse will review your specific instructions with you at the call.     Smoking impacts your body's ability to heal properly.  If you are a smoker, we strongly urge you to stop smoking 4-6 weeks before surgery. Plastic surgery patients ar required to be off nicotine for at least 8 weeks before surgery.    You will need an adult to drive you home and stay with you 24 hours after surgery. Public transportation or Medical Van Services are not permitted.    You may have one family member wait in the lobby at the surgery center during your surgery. Visitor restrictions are subject to change, please verify with the  pre-op nurse when they call.    If the community sees a new COVID19 surge, your procedure may need to be postponed. We will contact you if this happens. You will be screened for high-risk exposure to Covid-19 during the pre-op call.  We encourage you to quarantine yourself away from any Covid-19 people for 10 days before surgery to avoid possible last minute cancellations.   When you arrive to the surgery center, you will again be screened for COVID19 symptoms. If you screen positive, your surgery will need to be postponed.    We always encourage you to notify your insurance any time you have medical tests or procedures scheduled including surgery. The number is usually right on the back of your insurance card. Please call [facility] Cost of Care at [phone]  if you'd like a surgery quote.       Call our office if you have any questions! Thank you!

## 2023-06-27 NOTE — TELEPHONE ENCOUNTER
Spoke with patient today regarding surgery scheduling     Went over details/instructions.    Surgery Letter sent via mail  (Please see LETTERS TAB in chart to retrieve a copy of this letter)

## 2023-07-01 NOTE — H&P (VIEW-ONLY)
[facility] Surgery Consult    HPI:    38 year old year old male who I have been consulted by Karen Soto for evaluation of Consult (Lipoma on R thigh- no images )  He has a mass of right thigh which was attempted to be excised in other clinic but unsuccessful and here for removal.  Appears as though it may be residual tissue.  And the lump in the mass in that area.  Causes him some symptoms he is diabetic.    Allergies:Patient has no known allergies.    Past Medical History:   Diagnosis Date     Type 2 diabetes mellitus with other specified complication, without long-term current use of insulin (H) 9/2/2021     Type 2 diabetes mellitus without complication, without long-term current use of insulin (H) 9/2/2021       History reviewed. No pertinent surgical history.    CURRENT MEDS:  Current Outpatient Medications   Medication     metFORMIN (GLUCOPHAGE XR) 500 MG 24 hr tablet     exenatide ER (BYDUREON BCISE) 2 MG/0.85ML auto-injector     No current facility-administered medications for this visit.       Family History   Problem Relation Age of Onset     Diabetes No family hx of      Coronary Artery Disease No family hx of      Hypertension No family hx of      Other Cancer No family hx of      Asthma No family hx of         reports that he has quit smoking. He has never used smokeless tobacco. He reports that he does not currently use alcohol. He reports that he does not use drugs.    Review of Systems:  Mass in the right thigh. Otherwise twelve system of review is negative.      OBJECTIVE:  Vitals:    06/16/23 1438   BP: 122/74   Weight: 101.6 kg (224 lb)     Body mass index is 33.56 kg/m .    EXAM:  GENERAL: This is a well-developed 38 year old male who appears his stated age  HEAD: normocephalic  HEENT: Pupils equal and reactive bilaterally  MOUTH: mucus membranes intact  CARDIAC: RRR without murmur  CHEST/LUNG:  Clear to auscultation  ABDOMEN: Soft, nontender, nondistended, no masses  EXTREMITIES:  Grossly normal, warm, right thigh mass 2x4 cm area  NEUROLOGIC: Focally intact, nonfocal  INTEGUMENT: No open lesions or ulcers  VASCULAR: Pulses intact, symmetrical upper and lower extremities.        LABS:  Lab Results   Component Value Date    WBC 7.8 01/13/2022    HGB 15.5 01/13/2022    HGB 16.6 05/04/2017    HCT 46.7 01/13/2022    HCT 49.5 05/04/2017    MCV 89 01/13/2022    MCV 96.6 05/04/2017     01/13/2022       Lab Results   Component Value Date    ALT 49 (H) 09/08/2021    AST 30 09/08/2021    ALKPHOS 83 09/08/2021          Assessment/Plan:   Mass right thigh    Plan excision under local MAC anesthesia same-day surgery setting.  Discussed and answered all questions with him today.  We will get set up in the very near future.    No follow-ups on file.    Naman Marie MD  General Surgery [phone]  Vascular Surgery [phone]

## 2023-07-01 NOTE — PROGRESS NOTES
[facility] Surgery Consult    HPI:    38 year old year old male who I have been consulted by Karen Soto for evaluation of Consult (Lipoma on R thigh- no images )  He has a mass of right thigh which was attempted to be excised in other clinic but unsuccessful and here for removal.  Appears as though it may be residual tissue.  And the lump in the mass in that area.  Causes him some symptoms he is diabetic.    Allergies:Patient has no known allergies.    Past Medical History:   Diagnosis Date     Type 2 diabetes mellitus with other specified complication, without long-term current use of insulin (H) 9/2/2021     Type 2 diabetes mellitus without complication, without long-term current use of insulin (H) 9/2/2021       History reviewed. No pertinent surgical history.    CURRENT MEDS:  Current Outpatient Medications   Medication     metFORMIN (GLUCOPHAGE XR) 500 MG 24 hr tablet     exenatide ER (BYDUREON BCISE) 2 MG/0.85ML auto-injector     No current facility-administered medications for this visit.       Family History   Problem Relation Age of Onset     Diabetes No family hx of      Coronary Artery Disease No family hx of      Hypertension No family hx of      Other Cancer No family hx of      Asthma No family hx of         reports that he has quit smoking. He has never used smokeless tobacco. He reports that he does not currently use alcohol. He reports that he does not use drugs.    Review of Systems:  Mass in the right thigh. Otherwise twelve system of review is negative.      OBJECTIVE:  Vitals:    06/16/23 1438   BP: 122/74   Weight: 101.6 kg (224 lb)     Body mass index is 33.56 kg/m .    EXAM:  GENERAL: This is a well-developed 38 year old male who appears his stated age  HEAD: normocephalic  HEENT: Pupils equal and reactive bilaterally  MOUTH: mucus membranes intact  CARDIAC: RRR without murmur  CHEST/LUNG:  Clear to auscultation  ABDOMEN: Soft, nontender, nondistended, no masses  EXTREMITIES:  Grossly normal, warm, right thigh mass 2x4 cm area  NEUROLOGIC: Focally intact, nonfocal  INTEGUMENT: No open lesions or ulcers  VASCULAR: Pulses intact, symmetrical upper and lower extremities.        LABS:  Lab Results   Component Value Date    WBC 7.8 01/13/2022    HGB 15.5 01/13/2022    HGB 16.6 05/04/2017    HCT 46.7 01/13/2022    HCT 49.5 05/04/2017    MCV 89 01/13/2022    MCV 96.6 05/04/2017     01/13/2022       Lab Results   Component Value Date    ALT 49 (H) 09/08/2021    AST 30 09/08/2021    ALKPHOS 83 09/08/2021          Assessment/Plan:   Mass right thigh    Plan excision under local MAC anesthesia same-day surgery setting.  Discussed and answered all questions with him today.  We will get set up in the very near future.    No follow-ups on file.    Naman Marie MD  General Surgery [phone]  Vascular Surgery [phone]

## 2023-07-05 PROBLEM — D17.30 LIPOMA OF SKIN AND SUBCUTANEOUS TISSUE: Status: ACTIVE | Noted: 2023-06-16

## 2023-07-11 ENCOUNTER — TELEPHONE (OUTPATIENT)
Dept: SURGERY | Facility: CLINIC | Age: 38
End: 2023-07-11
Payer: COMMERCIAL

## 2023-07-11 NOTE — TELEPHONE ENCOUNTER
Patient called late yesterday afternoon asking if he needs a separate pre-op physical for his surgery with Dr. Marie on Thursday of this week. I told him I would need to talk to Dr. Marie and call him back. Per Dr. Marie, he does not need a separate pre-op physical. I talked to Phu and relayed this information. I let him know I will let the pre-op nurse at [facility] know he doesn't need the pre-op.

## 2023-07-12 ENCOUNTER — ANESTHESIA EVENT (OUTPATIENT)
Dept: SURGERY | Facility: AMBULATORY SURGERY CENTER | Age: 38
End: 2023-07-12
Payer: COMMERCIAL

## 2023-07-13 ENCOUNTER — HOSPITAL ENCOUNTER (OUTPATIENT)
Facility: AMBULATORY SURGERY CENTER | Age: 38
Discharge: HOME | End: 2023-07-13
Attending: SPECIALIST
Payer: COMMERCIAL

## 2023-07-13 ENCOUNTER — ANESTHESIA (OUTPATIENT)
Dept: SURGERY | Facility: AMBULATORY SURGERY CENTER | Age: 38
End: 2023-07-13
Payer: COMMERCIAL

## 2023-07-13 VITALS
SYSTOLIC BLOOD PRESSURE: 111 MMHG | DIASTOLIC BLOOD PRESSURE: 58 MMHG | OXYGEN SATURATION: 93 % | TEMPERATURE: 97.7 F | HEART RATE: 86 BPM | RESPIRATION RATE: 16 BRPM

## 2023-07-13 DIAGNOSIS — D17.30 LIPOMA OF SKIN AND SUBCUTANEOUS TISSUE: ICD-10-CM

## 2023-07-13 LAB — GLUCOSE POCT: 220 MG/DL (ref 70–99)

## 2023-07-13 PROCEDURE — 11406 EXC TR-EXT B9+MARG >4.0 CM: CPT | Performed by: SPECIALIST

## 2023-07-13 PROCEDURE — 12034 INTMD RPR S/TR/EXT 7.6-12.5: CPT | Mod: 51 | Performed by: SPECIALIST

## 2023-07-13 RX ORDER — FENTANYL CITRATE 0.05 MG/ML
25 INJECTION, SOLUTION INTRAMUSCULAR; INTRAVENOUS EVERY 5 MIN PRN
Status: DISCONTINUED | OUTPATIENT
Start: 2023-07-13 | End: 2023-07-14 | Stop reason: HOSPADM

## 2023-07-13 RX ORDER — ACETAMINOPHEN 325 MG/1
975 TABLET ORAL ONCE
Status: COMPLETED | OUTPATIENT
Start: 2023-07-13 | End: 2023-07-13

## 2023-07-13 RX ORDER — KETOROLAC TROMETHAMINE 30 MG/ML
INJECTION, SOLUTION INTRAMUSCULAR; INTRAVENOUS PRN
Status: DISCONTINUED | OUTPATIENT
Start: 2023-07-13 | End: 2023-07-13

## 2023-07-13 RX ORDER — PROPOFOL 10 MG/ML
INJECTION, EMULSION INTRAVENOUS PRN
Status: DISCONTINUED | OUTPATIENT
Start: 2023-07-13 | End: 2023-07-13

## 2023-07-13 RX ORDER — HYDROCODONE BITARTRATE AND ACETAMINOPHEN 5; 325 MG/1; MG/1
1-2 TABLET ORAL EVERY 6 HOURS PRN
Qty: 18 TABLET | Refills: 0 | Status: SHIPPED | OUTPATIENT
Start: 2023-07-13 | End: 2023-07-16

## 2023-07-13 RX ORDER — HYDROMORPHONE HCL IN WATER/PF 6 MG/30 ML
0.4 PATIENT CONTROLLED ANALGESIA SYRINGE INTRAVENOUS EVERY 5 MIN PRN
Status: DISCONTINUED | OUTPATIENT
Start: 2023-07-13 | End: 2023-07-14 | Stop reason: HOSPADM

## 2023-07-13 RX ORDER — HYDROMORPHONE HCL IN WATER/PF 6 MG/30 ML
0.2 PATIENT CONTROLLED ANALGESIA SYRINGE INTRAVENOUS EVERY 5 MIN PRN
Status: DISCONTINUED | OUTPATIENT
Start: 2023-07-13 | End: 2023-07-14 | Stop reason: HOSPADM

## 2023-07-13 RX ORDER — CEFAZOLIN SODIUM 2 G/100ML
2 INJECTION, SOLUTION INTRAVENOUS SEE ADMIN INSTRUCTIONS
Status: DISCONTINUED | OUTPATIENT
Start: 2023-07-13 | End: 2023-07-14 | Stop reason: HOSPADM

## 2023-07-13 RX ORDER — LIDOCAINE HYDROCHLORIDE 10 MG/ML
INJECTION, SOLUTION INFILTRATION; PERINEURAL PRN
Status: DISCONTINUED | OUTPATIENT
Start: 2023-07-13 | End: 2023-07-13

## 2023-07-13 RX ORDER — FENTANYL CITRATE 50 UG/ML
INJECTION, SOLUTION INTRAMUSCULAR; INTRAVENOUS PRN
Status: DISCONTINUED | OUTPATIENT
Start: 2023-07-13 | End: 2023-07-13

## 2023-07-13 RX ORDER — SODIUM CHLORIDE, SODIUM LACTATE, POTASSIUM CHLORIDE, CALCIUM CHLORIDE 600; 310; 30; 20 MG/100ML; MG/100ML; MG/100ML; MG/100ML
INJECTION, SOLUTION INTRAVENOUS CONTINUOUS
Status: DISCONTINUED | OUTPATIENT
Start: 2023-07-13 | End: 2023-07-14 | Stop reason: HOSPADM

## 2023-07-13 RX ORDER — ONDANSETRON 2 MG/ML
4 INJECTION INTRAMUSCULAR; INTRAVENOUS EVERY 30 MIN PRN
Status: DISCONTINUED | OUTPATIENT
Start: 2023-07-13 | End: 2023-07-14 | Stop reason: HOSPADM

## 2023-07-13 RX ORDER — ONDANSETRON 4 MG/1
4 TABLET, ORALLY DISINTEGRATING ORAL EVERY 30 MIN PRN
Status: DISCONTINUED | OUTPATIENT
Start: 2023-07-13 | End: 2023-07-14 | Stop reason: HOSPADM

## 2023-07-13 RX ORDER — FENTANYL CITRATE 0.05 MG/ML
25 INJECTION, SOLUTION INTRAMUSCULAR; INTRAVENOUS
Status: DISCONTINUED | OUTPATIENT
Start: 2023-07-13 | End: 2023-07-14 | Stop reason: HOSPADM

## 2023-07-13 RX ORDER — GLYCOPYRROLATE 0.2 MG/ML
INJECTION, SOLUTION INTRAMUSCULAR; INTRAVENOUS PRN
Status: DISCONTINUED | OUTPATIENT
Start: 2023-07-13 | End: 2023-07-13

## 2023-07-13 RX ORDER — CEFAZOLIN SODIUM 2 G/100ML
2 INJECTION, SOLUTION INTRAVENOUS
Status: COMPLETED | OUTPATIENT
Start: 2023-07-13 | End: 2023-07-13

## 2023-07-13 RX ORDER — FENTANYL CITRATE 0.05 MG/ML
50 INJECTION, SOLUTION INTRAMUSCULAR; INTRAVENOUS EVERY 5 MIN PRN
Status: DISCONTINUED | OUTPATIENT
Start: 2023-07-13 | End: 2023-07-14 | Stop reason: HOSPADM

## 2023-07-13 RX ORDER — ONDANSETRON 2 MG/ML
INJECTION INTRAMUSCULAR; INTRAVENOUS PRN
Status: DISCONTINUED | OUTPATIENT
Start: 2023-07-13 | End: 2023-07-13

## 2023-07-13 RX ORDER — ACETAMINOPHEN 325 MG/1
975 TABLET ORAL
Status: DISCONTINUED | OUTPATIENT
Start: 2023-07-13 | End: 2023-07-14 | Stop reason: HOSPADM

## 2023-07-13 RX ORDER — OXYCODONE HYDROCHLORIDE 5 MG/1
5 TABLET ORAL
Status: DISCONTINUED | OUTPATIENT
Start: 2023-07-13 | End: 2023-07-14 | Stop reason: HOSPADM

## 2023-07-13 RX ORDER — LIDOCAINE 40 MG/G
CREAM TOPICAL
Status: DISCONTINUED | OUTPATIENT
Start: 2023-07-13 | End: 2023-07-14 | Stop reason: HOSPADM

## 2023-07-13 RX ORDER — ACETAMINOPHEN 325 MG/1
975 TABLET ORAL ONCE
Status: DISCONTINUED | OUTPATIENT
Start: 2023-07-13 | End: 2023-07-14 | Stop reason: HOSPADM

## 2023-07-13 RX ORDER — PROPOFOL 10 MG/ML
INJECTION, EMULSION INTRAVENOUS CONTINUOUS PRN
Status: DISCONTINUED | OUTPATIENT
Start: 2023-07-13 | End: 2023-07-13

## 2023-07-13 RX ORDER — OXYCODONE HYDROCHLORIDE 10 MG/1
10 TABLET ORAL
Status: DISCONTINUED | OUTPATIENT
Start: 2023-07-13 | End: 2023-07-14 | Stop reason: HOSPADM

## 2023-07-13 RX ORDER — MEPERIDINE HYDROCHLORIDE 25 MG/ML
12.5 INJECTION INTRAMUSCULAR; INTRAVENOUS; SUBCUTANEOUS EVERY 5 MIN PRN
Status: DISCONTINUED | OUTPATIENT
Start: 2023-07-13 | End: 2023-07-14 | Stop reason: HOSPADM

## 2023-07-13 RX ADMIN — SODIUM CHLORIDE, SODIUM LACTATE, POTASSIUM CHLORIDE, CALCIUM CHLORIDE: 600; 310; 30; 20 INJECTION, SOLUTION INTRAVENOUS at 07:02

## 2023-07-13 RX ADMIN — PROPOFOL 20 MG: 10 INJECTION, EMULSION INTRAVENOUS at 07:15

## 2023-07-13 RX ADMIN — FENTANYL CITRATE 25 MCG: 50 INJECTION, SOLUTION INTRAMUSCULAR; INTRAVENOUS at 07:12

## 2023-07-13 RX ADMIN — GLYCOPYRROLATE 0.2 MG: 0.2 INJECTION, SOLUTION INTRAMUSCULAR; INTRAVENOUS at 07:08

## 2023-07-13 RX ADMIN — ACETAMINOPHEN 975 MG: 325 TABLET ORAL at 07:00

## 2023-07-13 RX ADMIN — CEFAZOLIN SODIUM 2 G: 2 INJECTION, SOLUTION INTRAVENOUS at 07:08

## 2023-07-13 RX ADMIN — PROPOFOL 160 MCG/KG/MIN: 10 INJECTION, EMULSION INTRAVENOUS at 07:07

## 2023-07-13 RX ADMIN — FENTANYL CITRATE 25 MCG: 50 INJECTION, SOLUTION INTRAMUSCULAR; INTRAVENOUS at 07:29

## 2023-07-13 RX ADMIN — KETOROLAC TROMETHAMINE 15 MG: 30 INJECTION, SOLUTION INTRAMUSCULAR; INTRAVENOUS at 07:37

## 2023-07-13 RX ADMIN — LIDOCAINE HYDROCHLORIDE 3 ML: 10 INJECTION, SOLUTION INFILTRATION; PERINEURAL at 07:07

## 2023-07-13 RX ADMIN — FENTANYL CITRATE 50 MCG: 50 INJECTION, SOLUTION INTRAMUSCULAR; INTRAVENOUS at 07:20

## 2023-07-13 RX ADMIN — ONDANSETRON 4 MG: 2 INJECTION INTRAMUSCULAR; INTRAVENOUS at 07:12

## 2023-07-13 NOTE — PROGRESS NOTES
Pt and family verbalize good understanding of discharge teach and follow up with MD.   VSS,Surgical incision CDI. D/C criteria met. Pt verbalizes readiness to go home.   Home with GF. RX @ [facility] to     @ME2@ 7/13/2023 8:41 AM

## 2023-07-13 NOTE — INTERVAL H&P NOTE
I have reviewed the surgical (or preoperative) H&P that is linked to this encounter, and examined the patient. There are no significant changes.        Naman Marie MD  General Surgery [phone]  Vascular Surgery [phone]          Clinical Conditions Present on Arrival:  Clinically Significant Risk Factors Present on Admission

## 2023-07-13 NOTE — ANESTHESIA POSTPROCEDURE EVALUATION
Patient: Phu Loja    Procedure: Procedure(s):  EXCISION THIGH MASS, LOWER EXTREMITY       Anesthesia Type:  MAC    Note:  Disposition: Outpatient   Postop Pain Control: Uneventful            Sign Out: Well controlled pain   PONV: No   Neuro/Psych: Uneventful            Sign Out: Acceptable/Baseline neuro status   Airway/Respiratory: Uneventful            Sign Out: Acceptable/Baseline resp. status   CV/Hemodynamics: Uneventful            Sign Out: Acceptable CV status; No obvious hypovolemia; No obvious fluid overload   Other NRE:    DID A NON-ROUTINE EVENT OCCUR?            Last vitals:  Vitals Value Taken Time   /53 07/13/23 0815   Temp 97.7  F (36.5  C) 07/13/23 0745   Pulse 82 07/13/23 0820   Resp 16 07/13/23 0800   SpO2 98 % 07/13/23 0820   Vitals shown include unvalidated device data.    Electronically Signed By: Sarah Beth Lamar MD  July 13, 2023  10:28 AM

## 2023-07-13 NOTE — INTERVAL H&P NOTE
I have reviewed the surgical (or preoperative) H&P that is linked to this encounter, and examined the patient. There are no significant changes.        Naman Marie MD  General Surgery [phone]  Vascular Surgery [phone]          Clinical Conditions Present on Arrival:  Clinically Significant Risk Factors Present on Admission

## 2023-07-13 NOTE — ANESTHESIA PREPROCEDURE EVALUATION
Anesthesia Pre-Procedure Evaluation    Patient: Phu Loja   MRN: [id number] : 1985        Procedure : Procedure(s):  IRRIGATION AND DEBRIDEMENT, LOWER EXTREMITY          Past Medical History:   Diagnosis Date     Type 2 diabetes mellitus with other specified complication, without long-term current use of insulin (H) 2021     Type 2 diabetes mellitus without complication, without long-term current use of insulin (H) 2021      History reviewed. No pertinent surgical history.   No Known Allergies   Social History     Tobacco Use     Smoking status: Former     Smokeless tobacco: Never   Substance Use Topics     Alcohol use: Not Currently      Wt Readings from Last 1 Encounters:   23 101.6 kg (224 lb)        Anesthesia Evaluation   Pt has had prior anesthetic.         ROS/MED HX  ENT/Pulmonary:     (+) HEATHER risk factors, snores loudly, obese,     Neurologic:  - neg neurologic ROS     Cardiovascular:  - neg cardiovascular ROS     METS/Exercise Tolerance:     Hematologic:       Musculoskeletal:       GI/Hepatic:  - neg GI/hepatic ROS     Renal/Genitourinary:       Endo:     (+) type II DM (uncontrolled), Last HgA1c: >9, date: 2022, Obesity,     Psychiatric/Substance Use:       Infectious Disease:       Malignancy:       Other:            Physical Exam    Airway        Mallampati: II   TM distance: > 3 FB   Neck ROM: full     Respiratory Devices and Support         Dental       (+) Minor Abnormalities - some fillings, tiny chips      Cardiovascular          Rhythm and rate: regular     Pulmonary           breath sounds clear to auscultation           OUTSIDE LABS:  CBC:   Lab Results   Component Value Date    WBC 7.8 2022    WBC 7.3 2021    HGB 15.5 2022    HGB 14.9 2021    HCT 46.7 2022    HCT 43.4 2021     2022     2021     BMP:   Lab Results   Component Value Date     (L) 2022     2021    POTASSIUM 4.5  11/08/2022    POTASSIUM 4.2 09/08/2021    CHLORIDE 99 11/08/2022    CHLORIDE 103 09/08/2021    CO2 22 11/08/2022    CO2 21 (L) 09/08/2021    BUN 14.6 11/08/2022    BUN 12 09/08/2021    CR 0.92 11/08/2022    CR 0.92 09/08/2021     (H) 11/08/2022     (H) 09/08/2021     COAGS: No results found for: PTT, INR, FIBR  POC: No results found for: BGM, HCG, HCGS  HEPATIC:   Lab Results   Component Value Date    ALBUMIN 4.2 09/08/2021    PROTTOTAL 7.9 09/08/2021    ALT 49 (H) 09/08/2021    AST 30 09/08/2021    ALKPHOS 83 09/08/2021    BILITOTAL 0.3 09/08/2021     OTHER:   Lab Results   Component Value Date    A1C 9.4 (H) 11/08/2022    NABOR 9.3 11/08/2022    LIPASE 26 09/08/2021       Anesthesia Plan    ASA Status:  3   NPO Status:  NPO Appropriate    Anesthesia Type: MAC.              Consents    Anesthesia Plan(s) and associated risks, benefits, and realistic alternatives discussed. Questions answered and patient/representative(s) expressed understanding.     - Discussed: Risks, Benefits and Alternatives for BOTH SEDATION and the PROCEDURE were discussed     - Discussed with:  Patient         Postoperative Care    Pain management: Multi-modal analgesia.        Comments:                Sarah Beth Lamar MD

## 2023-07-13 NOTE — ANESTHESIA CARE TRANSFER NOTE
Patient: Phu Loja    Procedure: Procedure(s):  EXCISION THIGH MASS, LOWER EXTREMITY       Diagnosis: Lipoma of skin and subcutaneous tissue [D17.30]  Diagnosis Additional Information: No value filed.    Anesthesia Type:   MAC     Note:    Oropharynx: oropharynx clear of all foreign objects  Level of Consciousness: drowsy  Oxygen Supplementation: face mask  Level of Supplemental Oxygen (L/min / FiO2): 6  Independent Airway: airway patency satisfactory and stable  Dentition: dentition unchanged  Vital Signs Stable: post-procedure vital signs reviewed and stable  Report to RN Given: handoff report given  Patient transferred to: Phase II    Handoff Report: Identifed the Patient, Identified the Reponsible Provider, Reviewed the pertinent medical history, Discussed the surgical course, Reviewed Intra-OP anesthesia mangement and issues during anesthesia, Set expectations for post-procedure period and Allowed opportunity for questions and acknowledgement of understanding      Vitals:  Vitals Value Taken Time   /55 07/13/23 0745   Temp 97.7  F (36.5  C) 07/13/23 0745   Pulse 84 07/13/23 0745   Resp 16 07/13/23 0745   SpO2 99 % 07/13/23 0745       Electronically Signed By: TAMI Alberts CRNA  July 13, 2023  7:48 AM

## 2023-07-13 NOTE — OP NOTE
Operative Note    Name:  Phu Loja  PCP:  Karen Soto  Procedure Date:  7/13/2023      Procedure(s):  EXCISION THIGH MASS, LOWER EXTREMITY    Pre-Procedure Diagnosis:  Lipoma of skin and subcutaneous tissue [D17.30]     Post-Procedure Diagnosis:    fatty mass right thigh    OR staff:  Surgeon(s):  Naman Marie MD  Circulator: Karen Olvera RN  Scrub Person: Ritchie White      Anesthesia Type:  MAC with Local    Past Medical History:   Diagnosis Date     Type 2 diabetes mellitus with other specified complication, without long-term current use of insulin (H) 09/02/2021     Type 2 diabetes mellitus without complication, without long-term current use of insulin (H) 09/02/2021       Patient Active Problem List    Diagnosis Date Noted     Lipoma of skin and subcutaneous tissue 06/16/2023     Priority: Medium     Type 2 diabetes mellitus with other specified complication, without long-term current use of insulin (H) 09/02/2021     Priority: Medium       Findings:  Mass 9x7x2 cm    Operative Report:    Consent was obtained.  The patient was taken to the operating room and placed in a supine position.  Patient's MAC anesthesia was administered by anesthesia staff.  Patient's right leg was prepped and draped sterile manner.  A briefing timeout was performed anesthesia and or staff.  We infiltrated the area of the incision and a field block the area pretty large lesion on the right thigh anterior.  I made elliptical incision and excised out this mass is a fat subcutaneous fat fatty tumor consistent with a lipoma and this is and removed in its entirety along with the limit of normal fatty tissue from the posterior fascial layer.  Upon completion of this to make sure that he good hemostasis I closed the deep layers with 3-0 Vicryl suture and the skin with a 4-0 Monocryl subcuticular stitch.  Steri-Strips and sterile dressing applied transferred to PACU in stable condition.  Mass sizes and findings above.    All  sponge needle counts are correct.    Estimated Blood Loss:   3 ml    Specimens:    ID Type Source Tests Collected by Time Destination   1 : Right thigh mass Tissue Leg, Right SURGICAL PATHOLOGY EXAM Naman Marie MD 7/13/2023  7:23 AM           Drains:   none    Complications:    None    Naman Marie MD     Date: 7/13/2023  Time: 8:02 AM

## 2023-07-13 NOTE — DISCHARGE INSTRUCTIONS
If you have any questions or concerns regarding your procedure, please contact Dr. Marie, his office number is [phone].           You have received 975 mg of Acetaminophen (Tylenol) at 7am. Please do not take an additional dose of Tylenol until after 1pm     Do not exceed 4,000 mg of acetaminophen during a 24 hour period and keep in mind that acetaminophen can also be found in many over-the-counter cold medications as well as narcotics that may be given for pain.       You received an IV medication today called Toradol. You received this medication at 7:30am . This is a NSAID. Therefore, do not take any NSAIDS (Ibuprofen products, Advil, Motrin, etc) until 1:30pm .

## 2023-07-21 ENCOUNTER — DOCUMENTATION ONLY (OUTPATIENT)
Dept: SURGERY | Facility: CLINIC | Age: 38
End: 2023-07-21
Payer: COMMERCIAL

## 2023-07-21 ENCOUNTER — TELEPHONE (OUTPATIENT)
Dept: VASCULAR SURGERY | Facility: CLINIC | Age: 38
End: 2023-07-21
Payer: COMMERCIAL

## 2023-07-21 NOTE — TELEPHONE ENCOUNTER
I called Phu and left a message informing him that his [facility] paperwork has been faxed. He was given our direct number to call if he has any concerns with healing from his large lipoma excision.

## 2023-07-21 NOTE — PROGRESS NOTES
Paperwork received from [employer] for short term disability and [facility]. Completed with a RTW date of 7/24/23 and faxed to [phone]

## 2023-07-24 NOTE — PROGRESS NOTES
José Miguel sent request for office notes. Printed and faxed office notes and OR report to  at [phone]

## 2023-11-08 ENCOUNTER — OFFICE VISIT (OUTPATIENT)
Dept: FAMILY MEDICINE | Facility: CLINIC | Age: 38
End: 2023-11-08
Payer: COMMERCIAL

## 2023-11-08 VITALS
HEART RATE: 103 BPM | OXYGEN SATURATION: 96 % | BODY MASS INDEX: 32.88 KG/M2 | WEIGHT: 222 LBS | DIASTOLIC BLOOD PRESSURE: 81 MMHG | RESPIRATION RATE: 18 BRPM | HEIGHT: 69 IN | TEMPERATURE: 98.6 F | SYSTOLIC BLOOD PRESSURE: 128 MMHG

## 2023-11-08 DIAGNOSIS — R11.0 NAUSEA: ICD-10-CM

## 2023-11-08 DIAGNOSIS — T75.3XXA MOTION SICKNESS, INITIAL ENCOUNTER: Primary | ICD-10-CM

## 2023-11-08 DIAGNOSIS — E11.69 TYPE 2 DIABETES MELLITUS WITH OTHER SPECIFIED COMPLICATION, WITHOUT LONG-TERM CURRENT USE OF INSULIN (H): ICD-10-CM

## 2023-11-08 LAB
ALBUMIN UR-MCNC: 100 MG/DL
ANION GAP SERPL CALCULATED.3IONS-SCNC: 3 MMOL/L (ref 3–14)
APPEARANCE UR: CLEAR
BACTERIA #/AREA URNS HPF: NORMAL /HPF
BILIRUB UR QL STRIP: NEGATIVE
BUN SERPL-MCNC: 8 MG/DL (ref 7–30)
CALCIUM SERPL-MCNC: 10 MG/DL (ref 8.5–10.1)
CHLORIDE BLD-SCNC: 104 MMOL/L (ref 94–109)
CHOLEST SERPL-MCNC: 176 MG/DL
CO2 SERPL-SCNC: 31 MMOL/L (ref 20–32)
COLOR UR AUTO: YELLOW
CREAT SERPL-MCNC: 0.9 MG/DL (ref 0.66–1.25)
CREAT UR-MCNC: 255 MG/DL
EGFRCR SERPLBLD CKD-EPI 2021: >90 ML/MIN/1.73M2
GLUCOSE BLD-MCNC: 171 MG/DL (ref 70–99)
GLUCOSE UR STRIP-MCNC: 250 MG/DL
HBA1C MFR BLD: 9.4 % (ref 0–5.6)
HDLC SERPL-MCNC: 35 MG/DL
HGB UR QL STRIP: NEGATIVE
KETONES UR STRIP-MCNC: NEGATIVE MG/DL
LDLC SERPL CALC-MCNC: 112 MG/DL
LEUKOCYTE ESTERASE UR QL STRIP: NEGATIVE
MICROALBUMIN UR-MCNC: 341 MG/L
MICROALBUMIN/CREAT UR: 133.73 MG/G CR (ref 0–17)
NITRATE UR QL: NEGATIVE
NONHDLC SERPL-MCNC: 141 MG/DL
PH UR STRIP: 8.5 [PH] (ref 5–8)
POTASSIUM BLD-SCNC: 4.5 MMOL/L (ref 3.4–5.3)
RBC #/AREA URNS AUTO: NORMAL /HPF
SODIUM SERPL-SCNC: 138 MMOL/L (ref 133–144)
SP GR UR STRIP: 1.02 (ref 1–1.03)
SQUAMOUS #/AREA URNS AUTO: NORMAL /LPF
TRIGL SERPL-MCNC: 145 MG/DL
UROBILINOGEN UR STRIP-ACNC: 0.2 E.U./DL
WBC #/AREA URNS AUTO: NORMAL /HPF

## 2023-11-08 PROCEDURE — 36415 COLL VENOUS BLD VENIPUNCTURE: CPT

## 2023-11-08 PROCEDURE — 80061 LIPID PANEL: CPT

## 2023-11-08 PROCEDURE — 82043 UR ALBUMIN QUANTITATIVE: CPT

## 2023-11-08 PROCEDURE — 80048 BASIC METABOLIC PNL TOTAL CA: CPT

## 2023-11-08 PROCEDURE — 82570 ASSAY OF URINE CREATININE: CPT

## 2023-11-08 PROCEDURE — 81001 URINALYSIS AUTO W/SCOPE: CPT

## 2023-11-08 PROCEDURE — 83036 HEMOGLOBIN GLYCOSYLATED A1C: CPT

## 2023-11-08 PROCEDURE — 99214 OFFICE O/P EST MOD 30 MIN: CPT | Mod: GC

## 2023-11-08 RX ORDER — ONDANSETRON 4 MG/1
4 TABLET, ORALLY DISINTEGRATING ORAL EVERY 8 HOURS PRN
Qty: 12 TABLET | Refills: 0 | Status: SHIPPED | OUTPATIENT
Start: 2023-11-08

## 2023-11-08 RX ORDER — HYDROCODONE BITARTRATE AND ACETAMINOPHEN 5; 325 MG/1; MG/1
1 TABLET ORAL EVERY 4 HOURS PRN
COMMUNITY
Start: 2023-07-13 | End: 2023-11-08

## 2023-11-08 RX ORDER — ONDANSETRON 4 MG/1
4 TABLET, ORALLY DISINTEGRATING ORAL EVERY 8 HOURS PRN
Qty: 12 TABLET | Refills: 0 | Status: SHIPPED | OUTPATIENT
Start: 2023-11-08 | End: 2023-11-08

## 2023-11-08 NOTE — PROGRESS NOTES
"  Assessment & Plan     Motion sickness  Nausea  Type 2 diabetes mellitus with other specified complication, without long-term current use of insulin (H)  38-year-old male with history of poorly controlled type 2 diabetes presents with 1 day of severe nausea after a 3-hour flight from [place] to [place] yesterday concerning for prolonged motion sickness.  Does not have any changes in stool and has not vomited with less concern for gastroenteritis.  Did evaluate for possible DKA with glucose and checking urine ketones given patient's poorly controlled diabetes however they both are reassuring.  Recommend patient follow-up in the next month to discuss diabetes management as he currently is only intermittently taking metformin and his prior A1c 1 year ago was 9.4.  Will send short supply of Zofran as needed for nausea.  Patient to return with new or worsening symptoms regards to nausea.  - Urine Macroscopic with reflex to Microscopic  - Albumin Random Urine Quantitative with Creat Ratio  - Basic metabolic panel  - Hemoglobin A1c  - Lipid panel reflex to direct LDL Fasting  - ondansetron (ZOFRAN ODT) 4 MG ODT tab  Dispense: 12 tablet; Refill: 0    Return in about 4 weeks (around 12/6/2023), or if symptoms worsen or fail to improve, for Follow up, with me.    Karen Soto MD  [facility]    Carmen Bay is a 38 year old, presenting for the following health issues:  Nausea (Stomach upset, feeling nauseated from a flight)        11/8/2023    12:56 PM   Additional Questions   Roomed by sudeep   Accompanied by self       HPI   38-year-old male with history of poorly controlled type 2 diabetes presents with 1 day of severe nausea after a 3-hour flight from [place] to [place] yesterday in which he experienced \"turbulence\" while in the air.  Since that time patient has had severe nausea but has not vomited.  He denies any abdominal pain.  He denies any diarrhea.  Patient had a normal bowel " "movement this morning.  He denies any dizziness or lightheadedness.  Patient has tried Advil with minimal improvement in nausea.  He has had decreased oral intake secondary to nausea.  Patient is never had symptoms like this before.    Patient states he was unable to  his bydureon due to insurance not covering it.  He states he only intermittently takes his metformin.  Patient states he sometimes checks his blood sugars at home in the range between 100 to 200.        Objective    /81 (BP Location: Left arm, Patient Position: Sitting, Cuff Size: Adult Large)   Pulse 103   Temp 98.6  F (37  C) (Oral)   Resp 18   Ht 1.74 m (5' 8.5\")   Wt 100.7 kg (222 lb)   SpO2 96%   BMI 33.26 kg/m    Body mass index is 33.26 kg/m .  Physical Exam   Constitutional: awake, alert, cooperative, mild distress  ENT: Normocephalic, without obvious abnormality, atraumatic  Respiratory: No increased work of breathing, good air exchange, clear to auscultation bilaterally, no crackles or wheezing  Cardiovascular: Regular rate and rhythm  GI: Normal bowel sounds, soft, non-distended, non-tender  Neurologic: Awake, alert, oriented to name, place and time.  Cranial nerves II-XII are grossly intact.    ----- Service Performed and Documented by Resident or Fellow ------      "

## 2023-11-08 NOTE — PATIENT INSTRUCTIONS
Thank you for coming into clinic today!     your prescription from the pharmacy  Schedule appointment at  for diabetes as it has been some time since this has been checked  Return to clinic if new or worsening symptoms  Sign up for MyChart to receive results, view appointments, and communicate with your healthcare team  Call [facility] at [phone] with questions or concerns    Take care,  Karen Soto MD

## 2023-11-08 NOTE — LETTER
November 8, 2023      Phu Loja  [address]   [place] [address]        To Whom It May Concern:    Phu Loja was seen in our clinic. Please excuse him from work through 11/10/23.      Sincerely,        Karen Soto MD

## 2023-11-08 NOTE — PROGRESS NOTES
Preceptor Attestation:    I discussed the patient with the resident and evaluated the patient in person. I have verified the content of the note, which accurately reflects my assessment of the patient and the plan of care.   Supervising Physician:  Kurt Kearns DO.